# Patient Record
Sex: FEMALE | Race: BLACK OR AFRICAN AMERICAN | Employment: OTHER | URBAN - METROPOLITAN AREA
[De-identification: names, ages, dates, MRNs, and addresses within clinical notes are randomized per-mention and may not be internally consistent; named-entity substitution may affect disease eponyms.]

---

## 2020-06-09 ENCOUNTER — OP HISTORICAL/CONVERTED ENCOUNTER (OUTPATIENT)
Dept: OTHER | Age: 70
End: 2020-06-09

## 2020-07-01 ENCOUNTER — OP HISTORICAL/CONVERTED ENCOUNTER (OUTPATIENT)
Dept: OTHER | Age: 70
End: 2020-07-01

## 2020-08-01 ENCOUNTER — OP HISTORICAL/CONVERTED ENCOUNTER (OUTPATIENT)
Dept: OTHER | Age: 70
End: 2020-08-01

## 2020-08-18 ENCOUNTER — OP HISTORICAL/CONVERTED ENCOUNTER (OUTPATIENT)
Dept: OTHER | Age: 70
End: 2020-08-18

## 2020-09-01 ENCOUNTER — OP HISTORICAL/CONVERTED ENCOUNTER (OUTPATIENT)
Dept: OTHER | Age: 70
End: 2020-09-01

## 2020-09-08 ENCOUNTER — ED HISTORICAL/CONVERTED ENCOUNTER (OUTPATIENT)
Dept: OTHER | Age: 70
End: 2020-09-08

## 2020-09-10 ENCOUNTER — HOSPITAL ENCOUNTER (EMERGENCY)
Age: 70
Discharge: HOME | End: 2020-09-11
Attending: EMERGENCY MEDICINE | Admitting: EMERGENCY MEDICINE
Payer: MEDICARE

## 2020-09-10 DIAGNOSIS — J40 BRONCHITIS DUE TO COVID-19 VIRUS: Primary | ICD-10-CM

## 2020-09-10 DIAGNOSIS — U07.1 BRONCHITIS DUE TO COVID-19 VIRUS: Primary | ICD-10-CM

## 2020-09-10 PROCEDURE — 74011250637 HC RX REV CODE- 250/637: Performed by: EMERGENCY MEDICINE

## 2020-09-10 PROCEDURE — 99284 EMERGENCY DEPT VISIT MOD MDM: CPT

## 2020-09-10 PROCEDURE — 83880 ASSAY OF NATRIURETIC PEPTIDE: CPT

## 2020-09-10 PROCEDURE — 84484 ASSAY OF TROPONIN QUANT: CPT

## 2020-09-10 PROCEDURE — 82550 ASSAY OF CK (CPK): CPT

## 2020-09-10 PROCEDURE — 80053 COMPREHEN METABOLIC PANEL: CPT

## 2020-09-10 PROCEDURE — 85025 COMPLETE CBC W/AUTO DIFF WBC: CPT

## 2020-09-10 PROCEDURE — 94640 AIRWAY INHALATION TREATMENT: CPT

## 2020-09-10 PROCEDURE — 85379 FIBRIN DEGRADATION QUANT: CPT

## 2020-09-10 PROCEDURE — 36415 COLL VENOUS BLD VENIPUNCTURE: CPT

## 2020-09-10 PROCEDURE — 93005 ELECTROCARDIOGRAM TRACING: CPT

## 2020-09-10 RX ADMIN — IPRATROPIUM BROMIDE AND ALBUTEROL 4 PUFF: 20; 100 SPRAY, METERED RESPIRATORY (INHALATION) at 23:28

## 2020-09-11 VITALS
SYSTOLIC BLOOD PRESSURE: 138 MMHG | WEIGHT: 262.13 LBS | OXYGEN SATURATION: 98 % | BODY MASS INDEX: 42.96 KG/M2 | TEMPERATURE: 97.8 F | RESPIRATION RATE: 16 BRPM | DIASTOLIC BLOOD PRESSURE: 68 MMHG | HEART RATE: 72 BPM

## 2020-09-11 LAB
ALBUMIN SERPL-MCNC: 3.3 G/DL (ref 3.5–5)
ALBUMIN/GLOB SERPL: 0.8 {RATIO} (ref 1.1–2.2)
ALP SERPL-CCNC: 86 U/L (ref 45–117)
ALT SERPL-CCNC: 26 U/L (ref 12–78)
ANION GAP SERPL CALC-SCNC: 3 MMOL/L (ref 5–15)
AST SERPL-CCNC: 23 U/L (ref 15–37)
ATRIAL RATE: 78 BPM
BASOPHILS # BLD: 0.1 K/UL (ref 0–0.1)
BASOPHILS NFR BLD: 2 % (ref 0–1)
BILIRUB SERPL-MCNC: 0.5 MG/DL (ref 0.2–1)
BNP SERPL-MCNC: 77 PG/ML
BUN SERPL-MCNC: 22 MG/DL (ref 6–20)
BUN/CREAT SERPL: 22 (ref 12–20)
CALCIUM SERPL-MCNC: 8.5 MG/DL (ref 8.5–10.1)
CALCULATED P AXIS, ECG09: -9 DEGREES
CALCULATED R AXIS, ECG10: -23 DEGREES
CALCULATED T AXIS, ECG11: 9 DEGREES
CHLORIDE SERPL-SCNC: 106 MMOL/L (ref 97–108)
CK SERPL-CCNC: 69 U/L (ref 26–192)
CO2 SERPL-SCNC: 29 MMOL/L (ref 21–32)
CREAT SERPL-MCNC: 1 MG/DL (ref 0.55–1.02)
D DIMER PPP FEU-MCNC: 0.5 MG/L FEU (ref 0–0.65)
DIAGNOSIS, 93000: NORMAL
DIFFERENTIAL METHOD BLD: ABNORMAL
EOSINOPHIL # BLD: 0.4 K/UL (ref 0–0.4)
EOSINOPHIL NFR BLD: 10 % (ref 0–7)
ERYTHROCYTE [DISTWIDTH] IN BLOOD BY AUTOMATED COUNT: 18.2 % (ref 11.5–14.5)
GLOBULIN SER CALC-MCNC: 4 G/DL (ref 2–4)
GLUCOSE SERPL-MCNC: 86 MG/DL (ref 65–100)
HCT VFR BLD AUTO: 36.7 % (ref 35–47)
HGB BLD-MCNC: 12.1 G/DL (ref 11.5–16)
IMM GRANULOCYTES # BLD AUTO: 0 K/UL (ref 0–0.04)
IMM GRANULOCYTES NFR BLD AUTO: 0 % (ref 0–0.5)
LYMPHOCYTES # BLD: 1.1 K/UL (ref 0.8–3.5)
LYMPHOCYTES NFR BLD: 28 % (ref 12–49)
MCH RBC QN AUTO: 29.7 PG (ref 26–34)
MCHC RBC AUTO-ENTMCNC: 33 G/DL (ref 30–36.5)
MCV RBC AUTO: 90.2 FL (ref 80–99)
MONOCYTES # BLD: 0.3 K/UL (ref 0–1)
MONOCYTES NFR BLD: 8 % (ref 5–13)
NEUTS SEG # BLD: 2.1 K/UL (ref 1.8–8)
NEUTS SEG NFR BLD: 52 % (ref 32–75)
NRBC # BLD: 0 K/UL (ref 0–0.01)
NRBC BLD-RTO: 0 PER 100 WBC
P-R INTERVAL, ECG05: 180 MS
PLATELET # BLD AUTO: 325 K/UL (ref 150–400)
PMV BLD AUTO: 9.8 FL (ref 8.9–12.9)
POTASSIUM SERPL-SCNC: 4.3 MMOL/L (ref 3.5–5.1)
PROT SERPL-MCNC: 7.3 G/DL (ref 6.4–8.2)
Q-T INTERVAL, ECG07: 374 MS
QRS DURATION, ECG06: 64 MS
QTC CALCULATION (BEZET), ECG08: 426 MS
RBC # BLD AUTO: 4.07 M/UL (ref 3.8–5.2)
SODIUM SERPL-SCNC: 138 MMOL/L (ref 136–145)
TROPONIN I SERPL-MCNC: <0.05 NG/ML
VENTRICULAR RATE, ECG03: 78 BPM
WBC # BLD AUTO: 4 K/UL (ref 3.6–11)

## 2020-09-11 RX ORDER — DOXYCYCLINE HYCLATE 100 MG
100 TABLET ORAL 2 TIMES DAILY
Qty: 20 TAB | Refills: 0 | Status: SHIPPED | OUTPATIENT
Start: 2020-09-11 | End: 2020-09-21

## 2020-09-11 RX ORDER — ASCORBIC ACID 500 MG
500 TABLET ORAL 2 TIMES DAILY
Qty: 20 TAB | Refills: 0 | Status: SHIPPED | OUTPATIENT
Start: 2020-09-11 | End: 2020-09-21

## 2020-09-11 RX ORDER — ALBUTEROL SULFATE 90 UG/1
2 AEROSOL, METERED RESPIRATORY (INHALATION)
Qty: 1 INHALER | Refills: 1 | Status: SHIPPED | OUTPATIENT
Start: 2020-09-11 | End: 2022-06-22

## 2020-09-11 RX ORDER — PREDNISONE 50 MG/1
50 TABLET ORAL DAILY
Qty: 5 TAB | Refills: 0 | Status: SHIPPED | OUTPATIENT
Start: 2020-09-11 | End: 2020-09-16

## 2020-09-11 RX ORDER — ZINC SULFATE 50(220)MG
220 CAPSULE ORAL DAILY
Qty: 10 CAP | Refills: 0 | Status: SHIPPED | OUTPATIENT
Start: 2020-09-11 | End: 2022-06-22

## 2020-09-11 NOTE — ED TRIAGE NOTES
Pt arrives for c/o worsening SOB sine being diagnosed with COVID on 8/25/20. Pt states she was seen at [facility] this past Tuesday and d/c home after chest xray and blood work. Pt states she was notified by PCP that she has a bronchitis and pneumonia. Was told to come to ED today for further evaluation. Pt reports she checks SpO2 at home, states it drops after exertion. Pt ambulated to triage. SpO2 99% on RA after ambulating.

## 2020-09-11 NOTE — DISCHARGE INSTRUCTIONS
Patient Education        Bronchitis: Care Instructions  Your Care Instructions    Bronchitis is inflammation of the bronchial tubes, which carry air to the lungs. The tubes swell and produce mucus, or phlegm. The mucus and inflamed bronchial tubes make you cough. You may have trouble breathing. Most cases of bronchitis are caused by viruses like those that cause colds. Antibiotics usually do not help and they may be harmful. Bronchitis usually develops rapidly and lasts about 2 to 3 weeks in otherwise healthy people. Follow-up care is a key part of your treatment and safety. Be sure to make and go to all appointments, and call your doctor if you are having problems. It's also a good idea to know your test results and keep a list of the medicines you take. How can you care for yourself at home? · Take all medicines exactly as prescribed. Call your doctor if you think you are having a problem with your medicine. · Get some extra rest.  · Take an over-the-counter pain medicine, such as acetaminophen (Tylenol), ibuprofen (Advil, Motrin), or naproxen (Aleve) to reduce fever and relieve body aches. Read and follow all instructions on the label. · Do not take two or more pain medicines at the same time unless the doctor told you to. Many pain medicines have acetaminophen, which is Tylenol. Too much acetaminophen (Tylenol) can be harmful. · Take an over-the-counter cough medicine that contains dextromethorphan to help quiet a dry, hacking cough so that you can sleep. Avoid cough medicines that have more than one active ingredient. Read and follow all instructions on the label. · Breathe moist air from a humidifier, hot shower, or sink filled with hot water. The heat and moisture will thin mucus so you can cough it out. · Do not smoke. Smoking can make bronchitis worse. If you need help quitting, talk to your doctor about stop-smoking programs and medicines.  These can increase your chances of quitting for good.  When should you call for help? Call 911 anytime you think you may need emergency care. For example, call if:    · You have severe trouble breathing.    Call your doctor now or seek immediate medical care if:    · You have new or worse trouble breathing.     · You cough up dark brown or bloody mucus (sputum).     · You have a new or higher fever.     · You have a new rash.    Watch closely for changes in your health, and be sure to contact your doctor if:    · You cough more deeply or more often, especially if you notice more mucus or a change in the color of your mucus.     · You are not getting better as expected. Where can you learn more? Go to http://viviana-kavita.info/  Enter H333 in the search box to learn more about \"Bronchitis: Care Instructions. \"  Current as of: June 9, 2019Content Version: 12.4  © 8021-8523 [employer]. Care instructions adapted under license by [employer] (which disclaims liability or warranty for this information). If you have questions about a medical condition or this instruction, always ask your healthcare professional. [place] [address] any warranty or liability for your use of this information. Patient Education        10 Things to Do When You Have COVID-19    Stay home. Don't go to school, work, or public areas. And don't use public transportation, ride-shares, or taxis unless you have no choice. Leave your home only if you need to get medical care. But call the doctor's office first so they know you're coming. And wear a cloth face cover. Ask before leaving isolation. Talk with your doctor or other health professional about when it will be safe for you to leave isolation. Wear a cloth face cover when you are around other people. It can help stop the spread of the virus when you cough or sneeze. Limit contact with people in your home.   If possible, stay in a separate bedroom and use a separate bathroom. Avoid contact with pets and other animals. If possible, have a friend or family member care for them while you're sick. Cover your mouth and nose with a tissue when you cough or sneeze. Then throw the tissue in the trash right away. Wash your hands often, especially after you cough or sneeze. Use soap and water, and scrub for at least 20 seconds. If soap and water aren't available, use an alcohol-based hand . Don't share personal household items. These include bedding, towels, cups and glasses, and eating utensils. Clean and disinfect your home every day. Use household  or disinfectant wipes or sprays. Take special care to clean things that you grab with your hands. These include doorknobs, remote controls, phones, and handles on your refrigerator and microwave. And don't forget countertops, tabletops, bathrooms, and computer keyboards. Take acetaminophen (Tylenol) to relieve fever and body aches. Read and follow all instructions on the label. Current as of: July 10, 2020               Content Version: 12.6  © 5369-4293 [employer]. Care instructions adapted under license by [employer] (which disclaims liability or warranty for this information). If you have questions about a medical condition or this instruction, always ask your healthcare professional. [address] any warranty or liability for your use of this information.

## 2020-09-11 NOTE — ED PROVIDER NOTES
The patient is a 79-year-old female with a past medical history significant for fibromyalgia, chronic pain, diabetes, hypertension, GERD, morbid obesity, and recent diagnosis of gout with 19 infection 2 weeks ago who presents to the ED with a complaint of persistent shortness of breath for a week on exertion. The patient also complains of dry cough. The patient states that she was seen at [facility] 2 days ago where she had a negative work-up for shortness of breath. She follow-up with her PCP the next day and was told that she has pneumonia. The patient has not had any improvement of her symptoms and her PCP referred her to this ER for further evaluation. She denies any fever, headache, sore throat, runny nose, congestion, nausea, vomiting, abdominal pain, diarrhea, constipation, dysuria, vaginal discharge or bleeding, extremity weakness or numbness, sick contact and skin rash.            Past Medical History:   Diagnosis Date    Chronic pain     fibromyalgia chronic bilateral knee pain    Diabetes (HCC)     Pre-diabetes    Fibromyalgia     GERD (gastroesophageal reflux disease)     Morbid obesity (HCC)        Past Surgical History:   Procedure Laterality Date    COLONOSCOPY N/A 12/14/2016    COLONOSCOPY / EGD  performed by Merna Lubin MD at [address] [facility] HX HYSTERECTOMY      HX ORTHOPAEDIC  2005    left bunionectomy    HX SHOULDER REPLACEMENT Bilateral     right 2013 left 2015         Family History:   Problem Relation Age of Onset    Heart Disease Other     Stroke Other        Social History     Socioeconomic History    Marital status:      Spouse name: Not on file    Number of children: Not on file    Years of education: Not on file    Highest education level: Not on file   Occupational History    Not on file   Social Needs    Financial resource strain: Not on file    Food insecurity     Worry: Not on file     Inability: Not on file   Elvira Chun Transportation needs     Medical: Not on file     Non-medical: Not on file   Tobacco Use    Smoking status: Former Smoker    Smokeless tobacco: Never Used   Substance and Sexual Activity    Alcohol use: No    Drug use: No    Sexual activity: Not on file   Lifestyle    Physical activity     Days per week: Not on file     Minutes per session: Not on file    Stress: Not on file   Relationships    Social connections     Talks on phone: Not on file     Gets together: Not on file     Attends Restorationist service: Not on file     Active member of club or organization: Not on file     Attends meetings of clubs or organizations: Not on file     Relationship status: Not on file    Intimate partner violence     Fear of current or ex partner: Not on file     Emotionally abused: Not on file     Physically abused: Not on file     Forced sexual activity: Not on file   Other Topics Concern    Not on file   Social History Narrative    Not on file         ALLERGIES: Oxycodone; Aspirin; Lamisil [terbinafine]; Sulfa (sulfonamide antibiotics); and Sulfabenzamide    Review of Systems    Vitals:    09/10/20 2136   BP: 149/85   Pulse: 81   Resp: 16   Temp: 98.8 °F (37.1 °C)   SpO2: 96%   Weight: 118.9 kg (262 lb 2 oz)            Physical Exam  Vitals signs and nursing note reviewed. CONSTITUTIONAL: Well-appearing; well-nourished; in no apparent distress  HEAD: Normocephalic; atraumatic  EYES: PERRL; EOM intact; conjunctiva and sclera are clear bilaterally. ENT: No rhinorrhea; normal pharynx with no tonsillar hypertrophy; mucous membranes pink/moist, no erythema, no exudate. NECK: Supple; non-tender; no cervical lymphadenopathy  CARD: Normal S1, S2; no murmurs, rubs, or gallops. Regular rate and rhythm. RESP: Normal respiratory effort; breath sounds clear and equal bilaterally; no wheezes, rhonchi, or rales. ABD: Normal bowel sounds; non-distended; non-tender; no palpable organomegaly, no masses, no bruits.   Back Exam: Normal inspection; no vertebral point tenderness, no CVA tenderness. Normal range of motion. EXT: Normal ROM in all four extremities; non-tender to palpation; no swelling or deformity; distal pulses are normal, no edema. SKIN: Warm; dry; no rash. NEURO:Alert and oriented x 3, coherent, ESTEFANY-XII grossly intact, sensory and motor are non-focal.        MDM  Number of Diagnoses or Management Options  Diagnosis management comments: Assessment: This is a 78-year-old female with a nonfocal exam who presents to the ED with persistent dyspnea on exertion and chills and allegedly diagnosis of pneumonia who is currently not on any antibiotics at this time. The patient will need evaluation for ACS, VTE, CHF, worsening pneumonia    Plan: EKG/chest x-ray/lab/IV fluid/DuoNeb/steroid/serial exam/ Monitor and Reevaluate. Amount and/or Complexity of Data Reviewed  Clinical lab tests: ordered and reviewed  Tests in the radiology section of CPT®: reviewed and ordered  Tests in the medicine section of CPT®: reviewed and ordered  Discussion of test results with the performing providers: yes  Decide to obtain previous medical records or to obtain history from someone other than the patient: yes  Obtain history from someone other than the patient: yes  Review and summarize past medical records: yes  Discuss the patient with other providers: yes  Independent visualization of images, tracings, or specimens: yes    Risk of Complications, Morbidity, and/or Mortality  Presenting problems: moderate  Diagnostic procedures: moderate  Management options: moderate           Procedures    ED EKG interpretation:  Rhythm: normal sinus rhythm; and regular . Rate (approx.): 78; Axis: left axis deviation; P wave: normal; QRS interval: normal ; ST/T wave: normal; in  Lead: 1 unitther findings: borderline ekg. This EKG was interpreted by Gee Alcantara MD,ED Provider.     Progress Note:   Pt has been reexamined by Gee Alcantara MD. Pt is feeling much better. Symptoms have improved. All available results have been reviewed with pt and any available family. Pt understands sx, dx, and tx in ED. Care plan has been outlined and questions have been answered. Pt is ready to go home. Will send home on COVID bronchitis/URI instruction. Prescription of doxycycline, albuterol inhaler and Robitussin instruction. Outpatient referral with PCP as needed. Written by Carlos Whitaker MD,4:25 AM    .   .

## 2020-09-11 NOTE — ED NOTES
Dr. Annabelle Dolan has reviewed discharge instructions with the patient. The patient verbalized understanding.

## 2020-09-12 ENCOUNTER — PATIENT OUTREACH (OUTPATIENT)
Dept: CASE MANAGEMENT | Age: 70
End: 2020-09-12

## 2020-09-12 NOTE — ACP (ADVANCE CARE PLANNING)
Advance Care Planning:   Does patient have an Advance Directive: currently not on file; patient declined education-wishes to add her daughter, Wayne Hdz at  to medical agent list.

## 2020-09-12 NOTE — PROGRESS NOTES
Patient contacted regarding COVID-19 diagnosis. Discussed COVID-19 related testing which was available at this time. Test results were positive. Patient informed of results, if available? yes -was tested at outside facility   Care Transition Nurse/ Ambulatory Care Manager/ LPN Care Coordinator contacted the patient by telephone to perform post discharge assessment. Verified name and  with patient as identifiers. Provided introduction to self, and explanation of the CTN/ACM/LPN role, and reason for call due to risk factors for infection and/or exposure to COVID-19. Symptoms reviewed with patient who verbalized the following symptoms: fever, fatigue, pain or aching joints, cough and shortness of breath. Due to no new or worsening symptoms encounter was not routed to provider for escalation. Discussed follow-up appointments. If no appointment was previously scheduled, appointment scheduling offered: no Patient has followed up with her PCP. [facility] follow up appointment(s): No future appointments. Non-[facility] follow up appointment(s): none reported      Advance Care Planning:   Does patient have an Advance Directive: currently not on file; patient declined education-wishes to add her daughter, Amarilis Funes at  to medical agent list.     Patient has following risk factors of: diabetes. CTN/ACM/LPN reviewed discharge instructions, medical action plan and red flags such as increased shortness of breath, increasing fever and signs of decompensation with patient who verbalized understanding. Discussed exposure protocols and quarantine with CDC Guidelines What to do if you are sick with coronavirus disease 2019.  Patient was given an opportunity for questions and concerns. The patient agrees to contact the [facility] exposure line [phone], [facility]  ([phone]) and PCP office for questions related to their healthcare.  CTN/ACM provided contact information for future needs. Reviewed and educated patient on any new and changed medications related to discharge diagnosis. Patient reported she has all discharge medications and is taking as prescribed. Patient/family/caregiver given information for [employer] and agrees to enroll yes  Patient's preferred e-mail:  n/a  Patient's preferred phone number: [phone]  Based on Loop alert triggers, patient will be contacted by nurse care manager for worsening symptoms. Pt will be further monitored by COVID Loop Team based on severity of symptoms and risk factors. [facility]  9/12/20 ACM attempted to contact patient at numbers provided-left messages for patient call back. DMB

## 2020-09-24 ENCOUNTER — PATIENT OUTREACH (OUTPATIENT)
Dept: CASE MANAGEMENT | Age: 70
End: 2020-09-24

## 2020-09-24 NOTE — PROGRESS NOTES
Yellow alert noted in remote symptom monitoring program. Messaged patient to notify [facility] if symptoms have worsened since yesterday or if they would like to have a nurse reach out.

## 2020-10-01 ENCOUNTER — OP HISTORICAL/CONVERTED ENCOUNTER (OUTPATIENT)
Dept: OTHER | Age: 70
End: 2020-10-01

## 2021-10-01 ENCOUNTER — TRANSCRIBE ORDER (OUTPATIENT)
Dept: SCHEDULING | Age: 71
End: 2021-10-01

## 2021-10-01 DIAGNOSIS — M06.00 RHEUMATOID ARTHRITIS WITHOUT RHEUMATOID FACTOR, UNSPECIFIED SITE (HCC): Primary | ICD-10-CM

## 2022-02-18 ENCOUNTER — TRANSCRIBE ORDER (OUTPATIENT)
Dept: SCHEDULING | Age: 72
End: 2022-02-18

## 2022-02-18 DIAGNOSIS — M17.12 DEGENERATIVE ARTHRITIS OF LEFT KNEE: Primary | ICD-10-CM

## 2022-03-02 ENCOUNTER — HOSPITAL ENCOUNTER (OUTPATIENT)
Dept: MRI IMAGING | Age: 72
Discharge: HOME | End: 2022-03-02
Attending: FAMILY MEDICINE
Payer: MEDICARE

## 2022-03-02 DIAGNOSIS — M06.00 RHEUMATOID ARTHRITIS WITHOUT RHEUMATOID FACTOR, UNSPECIFIED SITE (HCC): ICD-10-CM

## 2022-03-02 DIAGNOSIS — M17.12 DEGENERATIVE ARTHRITIS OF LEFT KNEE: ICD-10-CM

## 2022-03-02 PROCEDURE — 72148 MRI LUMBAR SPINE W/O DYE: CPT

## 2022-03-02 PROCEDURE — 73721 MRI JNT OF LWR EXTRE W/O DYE: CPT

## 2022-06-22 ENCOUNTER — APPOINTMENT (OUTPATIENT)
Dept: CT IMAGING | Age: 72
DRG: 270 | End: 2022-06-22
Attending: EMERGENCY MEDICINE
Payer: MEDICARE

## 2022-06-22 ENCOUNTER — HOSPITAL ENCOUNTER (INPATIENT)
Age: 72
LOS: 2 days | Discharge: HOME | DRG: 270 | End: 2022-06-24
Attending: EMERGENCY MEDICINE | Admitting: INTERNAL MEDICINE
Payer: MEDICARE

## 2022-06-22 ENCOUNTER — APPOINTMENT (OUTPATIENT)
Dept: VASCULAR SURGERY | Age: 72
DRG: 270 | End: 2022-06-22
Attending: EMERGENCY MEDICINE
Payer: MEDICARE

## 2022-06-22 DIAGNOSIS — I82.90 CLOT: ICD-10-CM

## 2022-06-22 DIAGNOSIS — I82.4Y2 ACUTE DEEP VEIN THROMBOSIS (DVT) OF PROXIMAL VEIN OF LEFT LOWER EXTREMITY (HCC): Primary | ICD-10-CM

## 2022-06-22 DIAGNOSIS — I26.94 MULTIPLE SUBSEGMENTAL PULMONARY EMBOLI WITHOUT ACUTE COR PULMONALE (HCC): ICD-10-CM

## 2022-06-22 PROBLEM — I26.99 BILATERAL PULMONARY EMBOLISM (HCC): Status: ACTIVE | Noted: 2022-06-22

## 2022-06-22 LAB
ALBUMIN SERPL-MCNC: 3 G/DL (ref 3.5–5)
ALBUMIN/GLOB SERPL: 0.7 {RATIO} (ref 1.1–2.2)
ALP SERPL-CCNC: 77 U/L (ref 45–117)
ALT SERPL-CCNC: 26 U/L (ref 12–78)
ANION GAP SERPL CALC-SCNC: 7 MMOL/L (ref 5–15)
APTT PPP: 24.2 SEC (ref 22.1–31)
APTT PPP: 32.3 SEC (ref 22.1–31)
AST SERPL-CCNC: 35 U/L (ref 15–37)
BASOPHILS # BLD: 0 K/UL (ref 0–0.1)
BASOPHILS # BLD: 0 K/UL (ref 0–0.1)
BASOPHILS NFR BLD: 1 % (ref 0–1)
BASOPHILS NFR BLD: 1 % (ref 0–1)
BILIRUB SERPL-MCNC: 1 MG/DL (ref 0.2–1)
BNP SERPL-MCNC: 78 PG/ML
BUN SERPL-MCNC: 21 MG/DL (ref 6–20)
BUN/CREAT SERPL: 22 (ref 12–20)
CALCIUM SERPL-MCNC: 9 MG/DL (ref 8.5–10.1)
CHLORIDE SERPL-SCNC: 102 MMOL/L (ref 97–108)
CO2 SERPL-SCNC: 28 MMOL/L (ref 21–32)
CREAT SERPL-MCNC: 0.95 MG/DL (ref 0.55–1.02)
DIFFERENTIAL METHOD BLD: ABNORMAL
DIFFERENTIAL METHOD BLD: ABNORMAL
EOSINOPHIL # BLD: 0.3 K/UL (ref 0–0.4)
EOSINOPHIL # BLD: 0.3 K/UL (ref 0–0.4)
EOSINOPHIL NFR BLD: 5 % (ref 0–7)
EOSINOPHIL NFR BLD: 6 % (ref 0–7)
ERYTHROCYTE [DISTWIDTH] IN BLOOD BY AUTOMATED COUNT: 18.5 % (ref 11.5–14.5)
ERYTHROCYTE [DISTWIDTH] IN BLOOD BY AUTOMATED COUNT: 18.7 % (ref 11.5–14.5)
GLOBULIN SER CALC-MCNC: 4.5 G/DL (ref 2–4)
GLUCOSE BLD STRIP.AUTO-MCNC: 72 MG/DL (ref 65–117)
GLUCOSE BLD STRIP.AUTO-MCNC: 75 MG/DL (ref 65–117)
GLUCOSE BLD STRIP.AUTO-MCNC: 88 MG/DL (ref 65–117)
GLUCOSE BLD STRIP.AUTO-MCNC: 99 MG/DL (ref 65–117)
GLUCOSE SERPL-MCNC: 105 MG/DL (ref 65–100)
HCT VFR BLD AUTO: 31.9 % (ref 35–47)
HCT VFR BLD AUTO: 35.8 % (ref 35–47)
HGB BLD-MCNC: 10.6 G/DL (ref 11.5–16)
HGB BLD-MCNC: 11.9 G/DL (ref 11.5–16)
IMM GRANULOCYTES # BLD AUTO: 0 K/UL (ref 0–0.04)
IMM GRANULOCYTES # BLD AUTO: 0 K/UL (ref 0–0.04)
IMM GRANULOCYTES NFR BLD AUTO: 0 % (ref 0–0.5)
IMM GRANULOCYTES NFR BLD AUTO: 0 % (ref 0–0.5)
LYMPHOCYTES # BLD: 1.3 K/UL (ref 0.8–3.5)
LYMPHOCYTES # BLD: 1.3 K/UL (ref 0.8–3.5)
LYMPHOCYTES NFR BLD: 20 % (ref 12–49)
LYMPHOCYTES NFR BLD: 26 % (ref 12–49)
MAGNESIUM SERPL-MCNC: 1.8 MG/DL (ref 1.6–2.4)
MCH RBC QN AUTO: 30.4 PG (ref 26–34)
MCH RBC QN AUTO: 30.5 PG (ref 26–34)
MCHC RBC AUTO-ENTMCNC: 33.2 G/DL (ref 30–36.5)
MCHC RBC AUTO-ENTMCNC: 33.2 G/DL (ref 30–36.5)
MCV RBC AUTO: 91.6 FL (ref 80–99)
MCV RBC AUTO: 91.9 FL (ref 80–99)
MONOCYTES # BLD: 0.2 K/UL (ref 0–1)
MONOCYTES # BLD: 0.2 K/UL (ref 0–1)
MONOCYTES NFR BLD: 3 % (ref 5–13)
MONOCYTES NFR BLD: 3 % (ref 5–13)
NEUTS SEG # BLD: 3.3 K/UL (ref 1.8–8)
NEUTS SEG # BLD: 4.5 K/UL (ref 1.8–8)
NEUTS SEG NFR BLD: 64 % (ref 32–75)
NEUTS SEG NFR BLD: 71 % (ref 32–75)
NRBC # BLD: 0 K/UL (ref 0–0.01)
NRBC # BLD: 0 K/UL (ref 0–0.01)
NRBC BLD-RTO: 0 PER 100 WBC
NRBC BLD-RTO: 0 PER 100 WBC
PLATELET # BLD AUTO: 250 K/UL (ref 150–400)
PLATELET # BLD AUTO: 266 K/UL (ref 150–400)
PMV BLD AUTO: 9.4 FL (ref 8.9–12.9)
PMV BLD AUTO: 9.8 FL (ref 8.9–12.9)
POTASSIUM SERPL-SCNC: 3.9 MMOL/L (ref 3.5–5.1)
PROT SERPL-MCNC: 7.5 G/DL (ref 6.4–8.2)
RBC # BLD AUTO: 3.47 M/UL (ref 3.8–5.2)
RBC # BLD AUTO: 3.91 M/UL (ref 3.8–5.2)
SERVICE CMNT-IMP: NORMAL
SODIUM SERPL-SCNC: 137 MMOL/L (ref 136–145)
THERAPEUTIC RANGE,PTTT: ABNORMAL SECS (ref 58–77)
THERAPEUTIC RANGE,PTTT: NORMAL SECS (ref 58–77)
TROPONIN-HIGH SENSITIVITY: 7 NG/L (ref 0–51)
WBC # BLD AUTO: 5.2 K/UL (ref 3.6–11)
WBC # BLD AUTO: 6.4 K/UL (ref 3.6–11)

## 2022-06-22 PROCEDURE — 74011000636 HC RX REV CODE- 636: Performed by: EMERGENCY MEDICINE

## 2022-06-22 PROCEDURE — 93005 ELECTROCARDIOGRAM TRACING: CPT

## 2022-06-22 PROCEDURE — 65270000046 HC RM TELEMETRY

## 2022-06-22 PROCEDURE — 85730 THROMBOPLASTIN TIME PARTIAL: CPT

## 2022-06-22 PROCEDURE — 99285 EMERGENCY DEPT VISIT HI MDM: CPT

## 2022-06-22 PROCEDURE — 74011250637 HC RX REV CODE- 250/637: Performed by: INTERNAL MEDICINE

## 2022-06-22 PROCEDURE — 36415 COLL VENOUS BLD VENIPUNCTURE: CPT

## 2022-06-22 PROCEDURE — 80053 COMPREHEN METABOLIC PANEL: CPT

## 2022-06-22 PROCEDURE — 71275 CT ANGIOGRAPHY CHEST: CPT

## 2022-06-22 PROCEDURE — 82962 GLUCOSE BLOOD TEST: CPT

## 2022-06-22 PROCEDURE — 93971 EXTREMITY STUDY: CPT

## 2022-06-22 PROCEDURE — 74011250636 HC RX REV CODE- 250/636: Performed by: EMERGENCY MEDICINE

## 2022-06-22 PROCEDURE — 74011250636 HC RX REV CODE- 250/636: Performed by: INTERNAL MEDICINE

## 2022-06-22 PROCEDURE — 85025 COMPLETE CBC W/AUTO DIFF WBC: CPT

## 2022-06-22 PROCEDURE — 74011000258 HC RX REV CODE- 258: Performed by: EMERGENCY MEDICINE

## 2022-06-22 PROCEDURE — 83880 ASSAY OF NATRIURETIC PEPTIDE: CPT

## 2022-06-22 PROCEDURE — 74011000250 HC RX REV CODE- 250: Performed by: INTERNAL MEDICINE

## 2022-06-22 PROCEDURE — 83735 ASSAY OF MAGNESIUM: CPT

## 2022-06-22 PROCEDURE — 84484 ASSAY OF TROPONIN QUANT: CPT

## 2022-06-22 RX ORDER — SODIUM CHLORIDE 0.9 % (FLUSH) 0.9 %
5-40 SYRINGE (ML) INJECTION EVERY 8 HOURS
Status: DISCONTINUED | OUTPATIENT
Start: 2022-06-22 | End: 2022-06-24 | Stop reason: HOSPADM

## 2022-06-22 RX ORDER — SEMAGLUTIDE 1.34 MG/ML
0.25 INJECTION, SOLUTION SUBCUTANEOUS
COMMUNITY

## 2022-06-22 RX ORDER — LANOLIN ALCOHOL/MO/W.PET/CERES
5 CREAM (GRAM) TOPICAL
Status: DISCONTINUED | OUTPATIENT
Start: 2022-06-22 | End: 2022-06-24 | Stop reason: HOSPADM

## 2022-06-22 RX ORDER — DULOXETIN HYDROCHLORIDE 30 MG/1
60 CAPSULE, DELAYED RELEASE ORAL DAILY
Status: DISCONTINUED | OUTPATIENT
Start: 2022-06-23 | End: 2022-06-22

## 2022-06-22 RX ORDER — HEPARIN SODIUM 1000 [USP'U]/ML
80 INJECTION, SOLUTION INTRAVENOUS; SUBCUTANEOUS ONCE
Status: COMPLETED | OUTPATIENT
Start: 2022-06-22 | End: 2022-06-22

## 2022-06-22 RX ORDER — MAGNESIUM SULFATE 100 %
4 CRYSTALS MISCELLANEOUS AS NEEDED
Status: DISCONTINUED | OUTPATIENT
Start: 2022-06-22 | End: 2022-06-24 | Stop reason: HOSPADM

## 2022-06-22 RX ORDER — TROSPIUM CHLORIDE 20 MG/1
20 TABLET, FILM COATED ORAL
COMMUNITY

## 2022-06-22 RX ORDER — ACETAMINOPHEN 325 MG/1
650 TABLET ORAL
Status: DISCONTINUED | OUTPATIENT
Start: 2022-06-22 | End: 2022-06-24 | Stop reason: HOSPADM

## 2022-06-22 RX ORDER — PANTOPRAZOLE SODIUM 40 MG/1
40 TABLET, DELAYED RELEASE ORAL
Status: DISCONTINUED | OUTPATIENT
Start: 2022-06-22 | End: 2022-06-24 | Stop reason: HOSPADM

## 2022-06-22 RX ORDER — ZINC SULFATE 50(220)MG
220 CAPSULE ORAL DAILY
Status: DISCONTINUED | OUTPATIENT
Start: 2022-06-23 | End: 2022-06-22

## 2022-06-22 RX ORDER — DULOXETIN HYDROCHLORIDE 30 MG/1
60 CAPSULE, DELAYED RELEASE ORAL
Status: DISCONTINUED | OUTPATIENT
Start: 2022-06-22 | End: 2022-06-24 | Stop reason: HOSPADM

## 2022-06-22 RX ORDER — DEXTROSE MONOHYDRATE 100 MG/ML
0-250 INJECTION, SOLUTION INTRAVENOUS AS NEEDED
Status: DISCONTINUED | OUTPATIENT
Start: 2022-06-22 | End: 2022-06-24 | Stop reason: HOSPADM

## 2022-06-22 RX ORDER — ASPIRIN 81 MG/1
81 TABLET ORAL DAILY
COMMUNITY
End: 2022-06-23

## 2022-06-22 RX ORDER — THERA TABS 400 MCG
1 TAB ORAL DAILY
Status: DISCONTINUED | OUTPATIENT
Start: 2022-06-23 | End: 2022-06-24 | Stop reason: HOSPADM

## 2022-06-22 RX ORDER — ATORVASTATIN CALCIUM 10 MG/1
5 TABLET, FILM COATED ORAL DAILY
Status: DISCONTINUED | OUTPATIENT
Start: 2022-06-23 | End: 2022-06-22

## 2022-06-22 RX ORDER — TROSPIUM CHLORIDE 20 MG/1
20 TABLET, FILM COATED ORAL
Status: DISCONTINUED | OUTPATIENT
Start: 2022-06-22 | End: 2022-06-24 | Stop reason: HOSPADM

## 2022-06-22 RX ORDER — SODIUM CHLORIDE 0.9 % (FLUSH) 0.9 %
5-40 SYRINGE (ML) INJECTION AS NEEDED
Status: DISCONTINUED | OUTPATIENT
Start: 2022-06-22 | End: 2022-06-24 | Stop reason: HOSPADM

## 2022-06-22 RX ORDER — HEPARIN SODIUM 5000 [USP'U]/ML
80 INJECTION, SOLUTION INTRAVENOUS; SUBCUTANEOUS
Status: COMPLETED | OUTPATIENT
Start: 2022-06-22 | End: 2022-06-22

## 2022-06-22 RX ORDER — CYANOCOBALAMIN/FOLIC AC/VIT B6 1-2.5-25MG
1 TABLET ORAL DAILY
COMMUNITY

## 2022-06-22 RX ORDER — HEPARIN SODIUM 5000 [USP'U]/ML
INJECTION, SOLUTION INTRAVENOUS; SUBCUTANEOUS
Status: DISPENSED
Start: 2022-06-22 | End: 2022-06-23

## 2022-06-22 RX ORDER — DICLOFENAC SODIUM 10 MG/G
2 GEL TOPICAL 4 TIMES DAILY
COMMUNITY
End: 2022-06-23

## 2022-06-22 RX ORDER — GABAPENTIN 400 MG/1
400 CAPSULE ORAL
COMMUNITY

## 2022-06-22 RX ORDER — HYDROCHLOROTHIAZIDE 25 MG/1
25 TABLET ORAL
Status: DISCONTINUED | OUTPATIENT
Start: 2022-06-22 | End: 2022-06-24 | Stop reason: HOSPADM

## 2022-06-22 RX ORDER — MELATONIN 5 MG
5 CAPSULE ORAL
COMMUNITY

## 2022-06-22 RX ORDER — INSULIN LISPRO 100 [IU]/ML
INJECTION, SOLUTION INTRAVENOUS; SUBCUTANEOUS
Status: DISCONTINUED | OUTPATIENT
Start: 2022-06-22 | End: 2022-06-23

## 2022-06-22 RX ORDER — HEPARIN SODIUM 10000 [USP'U]/100ML
13-36 INJECTION, SOLUTION INTRAVENOUS
Status: DISCONTINUED | OUTPATIENT
Start: 2022-06-22 | End: 2022-06-24

## 2022-06-22 RX ADMIN — HEPARIN SODIUM 13 UNITS/KG/HR: 10000 INJECTION INTRAVENOUS; SUBCUTANEOUS at 16:06

## 2022-06-22 RX ADMIN — IOPAMIDOL 100 ML: 755 INJECTION, SOLUTION INTRAVENOUS at 13:36

## 2022-06-22 RX ADMIN — Medication 10 ML: at 16:27

## 2022-06-22 RX ADMIN — GABAPENTIN 400 MG: 100 CAPSULE ORAL at 21:31

## 2022-06-22 RX ADMIN — HEPARIN SODIUM 9260 UNITS: 1000 INJECTION INTRAVENOUS; SUBCUTANEOUS at 16:03

## 2022-06-22 RX ADMIN — DULOXETINE HYDROCHLORIDE 60 MG: 30 CAPSULE, DELAYED RELEASE ORAL at 21:32

## 2022-06-22 RX ADMIN — HEPARIN SODIUM 9250 UNITS: 5000 INJECTION INTRAVENOUS; SUBCUTANEOUS at 22:44

## 2022-06-22 RX ADMIN — TROSPIUM CHLORIDE 20 MG: 20 TABLET, FILM COATED ORAL at 21:32

## 2022-06-22 RX ADMIN — Medication 10 ML: at 21:33

## 2022-06-22 RX ADMIN — Medication 4.5 MG: at 22:28

## 2022-06-22 RX ADMIN — HYDROCHLOROTHIAZIDE 25 MG: 25 TABLET ORAL at 21:32

## 2022-06-22 RX ADMIN — PANTOPRAZOLE SODIUM 40 MG: 40 TABLET, DELAYED RELEASE ORAL at 16:25

## 2022-06-22 NOTE — Clinical Note
vein. Accessed successfully. Micropuncture needle used. Using ultrasound guidance. Number of attempts =  2.  Left popliteal

## 2022-06-22 NOTE — ED NOTES
11:35 PM  Bedside and Verbal shift change report given to KEYONNA Whitlock RN (oncoming nurse) by KEYONNA Luevano RN (offgoing nurse). Report included the following information SBAR, Kardex, ED Summary, Intake/Output, MAR, Recent Results and Cardiac Rhythm NSR.     8:42 PM  Spoke to Dr. Elvin Nowak and obtianed verbal order for patient's home medications as patient had concerns as to why she has not received her evening dose of Trospium 20mg, melatonin 5mg , HCTZ 25mg, cymbalta 60mg, gabapentin 400mg.      6:15 PM  Patient was able to finally receive her dinner tray and eat , BS rechecked and stable. 4:29 PM  BS checked 72, pt provided with OJ and peanut butter crackers. Will recheck BS accordingly. 3:21 PM  Assumed care of patient, did not received report from an RN as patient was awaiting a room in waiting room. Upon assuming care of patient. Ordered labs samples obtained & sent to lab. Patient resting in stretcher in low and locked position, call bell within reach. Introduced self as primary nurse. Discussed plan of care with patient. Opportunity to ask questions given. Patient advised that medical information will be discussed and it is their own responsibility to let nurse know if such conversation should not take place in presence of visitors. Patient verbalizes understanding. Patient.  Denies any additional complaints at this time.

## 2022-06-22 NOTE — H&P
History and Physical  NAME: Gin Kelley  MRN: [id number]  YOB: 1950  AGE: 71 y.o.  female  SOCIAL SECURITY NUMBER: [id number]  Primary Care Provider: Adin Zendejas DO  CODE STATUS: Full Code      ASSESSMENT/PLAN:  1. Acute bilateral pulmonary emboli. Patient has been placed on heparin drip. Will request hematology consultation. Request echocardiogram to definitively rule out right heart strain. 2.  Acute extensive left lower extremity deep vein thrombosis. Heparin drip as above. Patient may actually need catheter directed thrombolysis? Defer to hematologist.  3.  Non-insulin-dependent diabetes mellitus. Sliding scale insulin. 4.  Fibromyalgia. History of Presenting Illness:   Gin Kelley is a 71 y.o. 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 Currently, her only project is caring for a 10year-old child with cerebral palsy who has a tracheostomy and PEG tube. On Monday, June 13, 2022, patient found herself especially winded, gasping with exertion, while taking care of the child. She states that she was so short of breath that she found herself dyspneic even while talking in a sitting position. She found herself draping over the childs bed to rest when she was standing up. She went to sit down. Patient states that the shortness of breath with exertion continued while she was at home. She spent most of the day in bed over the ensuing nine days when not at work. She continued to work. Patient did find herself more tired than usual at work, and occasionally she her heart rate seem to be mildly increased. She would notice mild racing heart sensation  only after exertion like getting her 10year old patient in the shower. She states that she has not had any recent travel, and has not had any recent surgeries. She states that she will started on Ozempic about five weeks ago.  The initial dose was 0.25 mg per week which patient took for the first two weeks, and then the dosage was increased to 0.5 mg per week which she took for another two weeks. She thought that maybe this new diabetic medication was making her feel tired. She states that she was getting a little weak wobbly when walking from 1 room to another. Patient stopped taking Ozempic over the last 1 week. Patient states that she did not have any chest pain, but did have pain in her shoulders which she  attributed to sleeping awkwardly. She denies having had any lower extremity pain other than chronic knee pain. Patient denies any calf redness or swelling. Patient denies any coughing. She does have a little bit of sinus congestion which is not unusual for her as she is prone to environmental allergies all year long. What was different was that she was feeling cold, which is somewhat unusual for her. Patient denies fever. Patient states that due to the generalized weakness, she had decreased PO intake over the past week. She was drinking a lot of Ginger ale. On Saturday, June 18, there was a period of about 30 minutes where she had vomiting and diarrhea. Patient look down at her legs, and  noticed that her left leg was larger than the right. It did cross her mind that she may have a blood clot in the left leg. On Sunday, June 19, patient continued noticing swelling of the left leg. At that point, it occurred to her that she may have a blood clot in her leg, and that it wasnt ozempic that was causing her weakness. She decided to follow up with her primary medical doctor today who referred her to the emergency department. The primary medical doctor also advised her to go down in the dosage of the Ozempic to 0.25 mg per week. Patient states that she has never had any blood clots before including in her extremities and her lungs. She denies any known clotting disorder in her family.            Review of Systems:  A comprehensive review of systems was negative except for that written in the History of Present Illness. Past Medical History:   Diagnosis Date    Chronic pain     fibromyalgia chronic bilateral knee pain    Diabetes ([facility]     Pre-diabetes    Fibromyalgia     GERD (gastroesophageal reflux disease)     Morbid obesity ([facility]         PAST SURGICAL HISTORY:   Past Surgical History:   Procedure Laterality Date    COLONOSCOPY N/A 12/14/2016    COLONOSCOPY / EGD  performed by Ciara Reza MD at [facility] HX HYSTERECTOMY      HX ORTHOPAEDIC  2005    left bunionectomy    HX SHOULDER REPLACEMENT Bilateral     right 2013 left 2015       Prior to Admission medications    Medication Sig Taking? Authorizing Provider   Cetirizine 10 mg cap Take 10 mg by mouth. Yes Other, MD Pankaj   aspirin delayed-release 81 mg tablet Take 81 mg by mouth daily. Yes Travis, MD Pankaj   folic acid-vit X5-TNM J48 ([place]) 2.5-25-1 mg tablet Take 1 Tablet by mouth daily. Yes Travis, MD Pankaj   trospium (SANCTURA) 20 mg tablet Take 20 mg by mouth Before breakfast and dinner. Yes Travis, MD Pankaj   diclofenac (VOLTAREN) 1 % gel Apply 2 g to affected area four (4) times daily. Yes Other, MD Pankaj   METHOTREXATE 20 mg by Does Not Apply route every seven (7) days. Yes Travis, MD Pankaj   semaglutide (Ozempic) 0.25 mg or 0.5 mg/dose (2 mg/1.5 ml) subq pen 0.25 mg by SubCUTAneous route every seven (7) days. Yes Other, MD Pankaj   golimumab ([address] ARIA IV) by IntraVENous route every two (2) months. Yes Other, MD Pankaj   melatonin 5 mg cap capsule Take 5 mg by mouth nightly. Yes Travis, MD Pankaj   gabapentin (NEURONTIN) 400 mg capsule Take 400 mg by mouth nightly. Yes Travis, MD Pankaj   budesonide/glycopyr/formoterol (BREZTRI AEROSPHERE IN) Take  by inhalation two (2) times a day. Yes Travis, MD Pankaj   pantoprazole (PROTONIX) 40 mg tablet Take 40 mg by mouth ACB/HS.  Yes Provider, Historical   traMADol (ULTRAM) 50 mg tablet Take 50 mg by mouth two (2) times a day. 50mg in morning  100mg at night  Indications: arthitis pain to left knee Yes Provider, Historical   acetaminophen (TYLENOL ARTHRITIS PAIN) 650 mg CR tablet Take 650 mg by mouth Before breakfast and dinner. 2 tablets in morning and 2 tablet in the evening  Indications: pain associated with arthritis Yes Provider, Historical   DULoxetine (CYMBALTA) 60 mg capsule 60 mg ACB/HS. Indications: disorder characterized by stiff, tender & painful muscles Yes Provider, Historical   hydrochlorothiazide (HYDRODIURIL) 25 mg tablet  Yes Provider, Historical       Allergies   Allergen Reactions    Oxycodone Rash     On chest and private area    Aspirin Rash     Rash in face and sone GI upset    Lamisil [Terbinafine] Other (comments)     Tingling around lip area    Sulfa (Sulfonamide Antibiotics) Other (comments)     Sores in throat    Sulfabenzamide Other (comments)        Family History   Problem Relation Age of Onset    Heart Disease Other     Stroke Other     Huntingtons disease Mother     Diabetes Brother     Arthritis-rheumatoid Maternal Grandmother         Social History     Tobacco Use    Smoking status: Former Smoker    Smokeless tobacco: Never Used   Substance Use Topics    Alcohol use: No    Drug use: No       Physical exam  Patient Vitals for the past 24 hrs:   BP Temp Pulse Resp SpO2  oxygen therapy   06/22/22 1902 (!) 120/48 98 °F (36.7 °C) 88 16 96 %  room air   06/22/22 1501 (!) 119/52 98.4 °F (36.9 °C) 70 19 100 %  room air   06/22/22 1226 108/64 99.4 °F (37.4 °C) 82 18 100 %  room air     Estimated body mass index is 42.43 kg/m² as calculated from the following:    Height as of this encounter: 5' 5\" (1.651 m). Weight as of this encounter: 115.7 kg (255 lb). General: In no acute distress. Well developed, well nourished. Head: Normocephalic, atraumatic. Eyes: Anicteric sclera. PERRL. Extraocular muscles intact.   ENT: External ears and nose appear normal.  Oral mucosa moist.  Neck: Supple. No jugular venous distention. Heart: Regular rate and rhythm. No murmurs appreciated. Chest: Symmetrical excursion. Clear to auscultation bilaterally. Abdomen: Soft, nontender. No abnormal distention. Bowel sounds are present throughout. Extremities: No gross deformities. Left leg is moderately larger than right; no cyanosis. Feet are warm to touch. Neurological: No lateralizing deficits. Alert, oriented X3. Skin: No jaundice. No rashes. Recent Results (from the past 24 hour(s))   CBC WITH AUTOMATED DIFF    Collection Time: 06/22/22 12:34 PM   Result Value Ref Range    WBC 6.4 3.6 - 11.0 K/uL    RBC 3.91 3.80 - 5.20 M/uL    HGB 11.9 11.5 - 16.0 g/dL    HCT 35.8 35.0 - 47.0 %    MCV 91.6 80.0 - 99.0 FL    MCH 30.4 26.0 - 34.0 PG    MCHC 33.2 30.0 - 36.5 g/dL    RDW 18.7 (H) 11.5 - 14.5 %    PLATELET 025 628 - 365 K/uL    MPV 9.8 8.9 - 12.9 FL    NRBC 0.0 0  WBC    ABSOLUTE NRBC 0.00 0.00 - 0.01 K/uL    NEUTROPHILS 71 32 - 75 %    LYMPHOCYTES 20 12 - 49 %    MONOCYTES 3 (L) 5 - 13 %    EOSINOPHILS 5 0 - 7 %    BASOPHILS 1 0 - 1 %    IMMATURE GRANULOCYTES 0 0.0 - 0.5 %    ABS. NEUTROPHILS 4.5 1.8 - 8.0 K/UL    ABS. LYMPHOCYTES 1.3 0.8 - 3.5 K/UL    ABS. MONOCYTES 0.2 0.0 - 1.0 K/UL    ABS. EOSINOPHILS 0.3 0.0 - 0.4 K/UL    ABS. BASOPHILS 0.0 0.0 - 0.1 K/UL    ABS. IMM.  GRANS. 0.0 0.00 - 0.04 K/UL    DF AUTOMATED     METABOLIC PANEL, COMPREHENSIVE    Collection Time: 06/22/22 12:34 PM   Result Value Ref Range    Sodium 137 136 - 145 mmol/L    Potassium 3.9 3.5 - 5.1 mmol/L    Chloride 102 97 - 108 mmol/L    CO2 28 21 - 32 mmol/L    Anion gap 7 5 - 15 mmol/L    Glucose 105 (H) 65 - 100 mg/dL    BUN 21 (H) 6 - 20 MG/DL    Creatinine 0.95 0.55 - 1.02 MG/DL    BUN/Creatinine ratio 22 (H) 12 - 20      GFR est AA >60 >60 ml/min/1.73m2    GFR est non-AA 58 (L) >60 ml/min/1.73m2    Calcium 9.0 8.5 - 10.1 MG/DL    Bilirubin, total 1.0 0.2 - 1.0 MG/DL    ALT (SGPT) 26 12 - 78 U/L    AST (SGOT) 35 15 - 37 U/L    Alk. phosphatase 77 45 - 117 U/L    Protein, total 7.5 6.4 - 8.2 g/dL    Albumin 3.0 (L) 3.5 - 5.0 g/dL    Globulin 4.5 (H) 2.0 - 4.0 g/dL    A-G Ratio 0.7 (L) 1.1 - 2.2     MAGNESIUM    Collection Time: 06/22/22 12:34 PM   Result Value Ref Range    Magnesium 1.8 1.6 - 2.4 mg/dL   NT-PRO BNP    Collection Time: 06/22/22 12:34 PM   Result Value Ref Range    NT pro-BNP 78 <125 PG/ML   TROPONIN-HIGH SENSITIVITY    Collection Time: 06/22/22 12:34 PM   Result Value Ref Range    Troponin-High Sensitivity 7 0 - 51 ng/L   EKG, 12 LEAD, INITIAL    Collection Time: 06/22/22 12:51 PM   Result Value Ref Range    Ventricular Rate 80 BPM    Atrial Rate 80 BPM    P-R Interval 188 ms    QRS Duration 78 ms    Q-T Interval 372 ms    QTC Calculation (Bezet) 429 ms    Calculated P Axis 25 degrees    Calculated R Axis -16 degrees    Calculated T Axis 4 degrees    Diagnosis       Normal sinus rhythm  Cannot rule out Anterior infarct , age undetermined  Abnormal ECG  When compared with ECG of 10-SEP-2020 22:48,  Nonspecific T wave abnormality now evident in Anterior leads     PTT    Collection Time: 06/22/22  3:18 PM   Result Value Ref Range    aPTT 24.2 22.1 - 31.0 sec    aPTT, therapeutic range     58.0 - 77.0 SECS   CBC WITH AUTOMATED DIFF    Collection Time: 06/22/22  3:23 PM   Result Value Ref Range    WBC 5.2 3.6 - 11.0 K/uL    RBC 3.47 (L) 3.80 - 5.20 M/uL    HGB 10.6 (L) 11.5 - 16.0 g/dL    HCT 31.9 (L) 35.0 - 47.0 %    MCV 91.9 80.0 - 99.0 FL    MCH 30.5 26.0 - 34.0 PG    MCHC 33.2 30.0 - 36.5 g/dL    RDW 18.5 (H) 11.5 - 14.5 %    PLATELET 525 279 - 088 K/uL    MPV 9.4 8.9 - 12.9 FL    NRBC 0.0 0  WBC    ABSOLUTE NRBC 0.00 0.00 - 0.01 K/uL    NEUTROPHILS 64 32 - 75 %    LYMPHOCYTES 26 12 - 49 %    MONOCYTES 3 (L) 5 - 13 %    EOSINOPHILS 6 0 - 7 %    BASOPHILS 1 0 - 1 %    IMMATURE GRANULOCYTES 0 0.0 - 0.5 %    ABS. NEUTROPHILS 3.3 1.8 - 8.0 K/UL    ABS. LYMPHOCYTES 1.3 0.8 - 3.5 K/UL    ABS. MONOCYTES 0.2 0.0 - 1.0 K/UL    ABS. EOSINOPHILS 0.3 0.0 - 0.4 K/UL    ABS. BASOPHILS 0.0 0.0 - 0.1 K/UL    ABS. IMM. GRANS. 0.0 0.00 - 0.04 K/UL    DF AUTOMATED     GLUCOSE, POC    Collection Time: 06/22/22  4:19 PM   Result Value Ref Range    Glucose (POC) 72 65 - 117 mg/dL    Performed by [facility], POC    Collection Time: 06/22/22  4:39 PM   Result Value Ref Range    Glucose (POC) 75 65 - 117 mg/dL    Performed by Marcia Locke    GLUCOSE, POC    Collection Time: 06/22/22  5:01 PM   Result Value Ref Range    Glucose (POC) 88 65 - 117 mg/dL    Performed by [address] [place] [address], POC    Collection Time: 06/22/22  6:13 PM   Result Value Ref Range    Glucose (POC) 99 65 - 117 mg/dL    Performed by [facility]    PTT    Collection Time: 06/22/22  9:30 PM   Result Value Ref Range    aPTT 32.3 (H) 22.1 - 31.0 sec    aPTT, therapeutic range     58.0 - 77.0 SECS       CTA CHEST W OR W WO CONT  Narrative: EXAM: CTA CHEST W OR W WO CONT    INDICATION: Left lower extremity swelling. Shortness of breath and diaphoresis. rule out PE    COMPARISON: None. CONTRAST: 100 mL of Isovue-370. TECHNIQUE:   Precontrast  images were obtained to localize the volume for acquisition. Multislice helical CT arteriography was performed from the diaphragm to the  thoracic inlet during uneventful rapid bolus intravenous contrast  administration. Lung and soft tissue windows were generated. Coronal and  sagittal images were generated and 3D post processing consisting of coronal  maximum intensity images was performed. CT dose reduction was achieved through  use of a standardized protocol tailored for this examination and automatic  exposure control for dose modulation. FINDINGS:  The lungs are clear of mass, nodule, airspace disease or edema.     The pulmonary arteries are well enhanced and no pulmonary emboli are there is a  moderate-sized saddle type embolus in the right main pulmonary artery, extending  into the right lower lobe. There is also a smaller embolism at the origin of the  pulmonary artery to the left lower lobe (series 2, images 74 and 61). There is  no bowing of the interventricular septum. There is no mediastinal or hilar adenopathy or mass. The aorta enhances normally  without evidence of aneurysm or dissection. The visualized portions of the upper abdominal organs are normal.  Impression: Bilateral pulmonary emboli, larger on the right. No evidence for peripheral infarction or right heart strain  This result was verbally relayed by me to Dr. Aryan Bell at 1358 hours  789      Bilateral lower extremity Doppler:  Right Lower Venous    For comparison purposes, the right common femoral vein was briefly interrogated. The vein demonstrates normal color filling and compressibility. Doppler flow was phasic and spontaneous. Left Lower Venous    Technically difficult exam due to: marked edema and tissue density. Acute occlusive thrombus present in the left common femoral vein. Acute occlusive thrombus present in the left great saphenous thigh vein. Acute occlusive thrombus present in the left saphenofemoral junction. Acute occlusive thrombus present in the left profunda femoral vein. Acute occlusive thrombus present in the left mid femoral vein. Acute occlusive thrombus present in the left distal femoral vein. Acute occlusive thrombus present in the left popliteal vein. The posterior tibial and peroneal vein(s) were not well visualized.

## 2022-06-22 NOTE — ED PROVIDER NOTES
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 She states recently she was at work and had an episode of significant tachypnea and diaphoresis. States that she was barely able to finish taking care of her patient at that time. She states she assumed that her sugars were low because she just started a medication for her diabetes. States that she was not able to check her sugars at that time as she did not have a glucometer. States that she then noticed that her left leg was significantly swollen. She was referred here by her primary care doctor to rule out PE and DVT. Denies previous episode of same and denies any recent travel or procedures or surgeries. Denies feeling short of breath at this time.            Past Medical History:   Diagnosis Date    Chronic pain     fibromyalgia chronic bilateral knee pain    Diabetes (HCC)     Pre-diabetes    Fibromyalgia     GERD (gastroesophageal reflux disease)     Morbid obesity (HCC)        Past Surgical History:   Procedure Laterality Date    COLONOSCOPY N/A 12/14/2016    COLONOSCOPY / EGD  performed by Sai Han MD at [address] [place] [address] HX HYSTERECTOMY      HX ORTHOPAEDIC  2005    left bunionectomy    HX SHOULDER REPLACEMENT Bilateral     right 2013 left 2015         Family History:   Problem Relation Age of Onset    Heart Disease Other     Stroke Other        Social History     Socioeconomic History    Marital status:      Spouse name: Not on file    Number of children: Not on file    Years of education: Not on file    Highest education level: Not on file   Occupational History    Not on file   Tobacco Use    Smoking status: Former Smoker    Smokeless tobacco: Never Used   Substance and Sexual Activity    Alcohol use: No    Drug use: No    Sexual activity: Not on file   Other Topics Concern    Not on file   Social History Narrative    Not on file     Social Determinants of Health     Financial Resource Strain:     Difficulty of Paying Living Expenses: Not on file   Food Insecurity:     Worried About [address] in the Last Year: Not on file    Lorenza of Food in the Last Year: Not on file   Transportation Needs:     Lack of Transportation (Medical): Not on file    Lack of Transportation (Non-Medical): Not on file   Physical Activity:     Days of Exercise per Week: Not on file    Minutes of Exercise per Session: Not on file   Stress:     Feeling of Stress : Not on file   Social Connections:     Frequency of Communication with Friends and Family: Not on file    Frequency of Social Gatherings with Friends and Family: Not on file    Attends Rastafarian Services: Not on file    Active Member of [address] [place] [address] or Organizations: Not on file    Attends Club or Organization Meetings: Not on file    Marital Status: Not on file   Intimate Partner Violence:     Fear of Current or Ex-Partner: Not on file    Emotionally Abused: Not on file    Physically Abused: Not on file    Sexually Abused: Not on file   Housing Stability:     Unable to Pay for Housing in the Last Year: Not on file    Number of [place] in the Last Year: Not on file    Unstable Housing in the Last Year: Not on file         ALLERGIES: Oxycodone, Aspirin, Lamisil [terbinafine], Sulfa (sulfonamide antibiotics), and Sulfabenzamide    Review of Systems   Constitutional: Positive for diaphoresis. Negative for fever. HENT: Negative for drooling. Respiratory: Positive for shortness of breath. Cardiovascular: Positive for leg swelling. Negative for chest pain. Gastrointestinal: Negative for abdominal pain. Genitourinary: Negative for dysuria. Musculoskeletal: Negative for back pain. Neurological: Positive for light-headedness. Negative for seizures. Psychiatric/Behavioral: Negative. Vitals:    06/22/22 1226   BP: 108/64   Pulse: 82   Resp: 18   Temp: 99.4 °F (37.4 °C)   SpO2: 100%   Weight: 115.7 kg (255 lb)   Height: 5' 5\" (1.651 m)            Physical Exam  Vitals and nursing note reviewed. Constitutional:       Appearance: Normal appearance. She is obese. She is not toxic-appearing or diaphoretic. HENT:      Head: Normocephalic and atraumatic. Right Ear: External ear normal.      Left Ear: External ear normal.      Nose: Nose normal.   Eyes:      General: No scleral icterus. Cardiovascular:      Rate and Rhythm: Normal rate. Pulses: Normal pulses. Heart sounds: No friction rub. Pulmonary:      Effort: Pulmonary effort is normal.      Breath sounds: Normal breath sounds. Abdominal:      Palpations: Abdomen is soft. Musculoskeletal:         General: Tenderness present. Cervical back: Neck supple. Right lower leg: No edema. Left lower leg: Edema present. Skin:     General: Skin is warm and dry. Neurological:      Mental Status: She is oriented to person, place, and time. Psychiatric:         Mood and Affect: Mood normal.         Behavior: Behavior normal.         Thought Content: Thought content normal.         Judgment: Judgment normal.          [facility]  ED Course as of 06/22/22 1338   Wed Jun 22, 2022   1304 EKG obtained at 1251 showing sinus rhythm, rate 80, normal intervals, nonspecific T wave inversion in lead III, not significantly changed compared to prior EKG. [JE]      ED Course User Index  [JE] Salvador Zaragoza MD       Procedures        Perfect Serve Consult for Admission  2:49 PM    ED Room Number: ER04/04  Patient Name and age:  David Lafleur 71 y.o.  female  Working Diagnosis:   1. Acute deep vein thrombosis (DVT) of proximal vein of left lower extremity ([address]    2.  Multiple subsegmental pulmonary emboli without acute cor pulmonale ([address]        COVID-19 Suspicion:  no  Sepsis present:  no  Reassessment needed: no  Code Status: Full Code  Readmission: no  Isolation Requirements:  no  Recommended Level of Care:  telemetry  Department:[facility] ED - [phone]  Other: Patient is a 29-year-old female who presents emergency department for evaluation of episode of shortness of breath and diaphoresis associated with left leg swelling. Patient with extensive clot noted in left leg but pulses intact. Also with bilateral PE without evidence of pulmonary infarct or right heart strain. No prior similar episodes. Vital signs stable.

## 2022-06-22 NOTE — ED TRIAGE NOTES
Patient arrives with swelling to LLE onset Saturday. Reports the week of the 13th she had episode of becoming acutely SOB and diaphoretic with generalized weakness that lasted a week. Continues with some exertional SOB since. States she didn't think about a blood clot, thought it was Ozempic which was new blood sugar medication. PCP saw her today and referred her here for DVT.

## 2022-06-23 LAB
APTT PPP: 125.4 SEC (ref 22.1–31)
APTT PPP: 44.9 SEC (ref 22.1–31)
APTT PPP: 79.5 SEC (ref 22.1–31)
APTT PPP: >130 SEC (ref 22.1–31)
APTT PPP: >130 SEC (ref 22.1–31)
ATRIAL RATE: 80 BPM
CALCULATED P AXIS, ECG09: 25 DEGREES
CALCULATED R AXIS, ECG10: -16 DEGREES
CALCULATED T AXIS, ECG11: 4 DEGREES
DIAGNOSIS, 93000: NORMAL
GLUCOSE BLD STRIP.AUTO-MCNC: 65 MG/DL (ref 65–117)
GLUCOSE BLD STRIP.AUTO-MCNC: 79 MG/DL (ref 65–117)
GLUCOSE BLD STRIP.AUTO-MCNC: 91 MG/DL (ref 65–117)
GLUCOSE BLD STRIP.AUTO-MCNC: 98 MG/DL (ref 65–117)
P-R INTERVAL, ECG05: 188 MS
Q-T INTERVAL, ECG07: 372 MS
QRS DURATION, ECG06: 78 MS
QTC CALCULATION (BEZET), ECG08: 429 MS
SERVICE CMNT-IMP: NORMAL
THERAPEUTIC RANGE,PTTT: ABNORMAL SECS (ref 58–77)
VENTRICULAR RATE, ECG03: 80 BPM

## 2022-06-23 PROCEDURE — 99223 1ST HOSP IP/OBS HIGH 75: CPT | Performed by: INTERNAL MEDICINE

## 2022-06-23 PROCEDURE — 06CD3ZZ EXTIRPATION OF MATTER FROM LEFT COMMON ILIAC VEIN, PERCUTANEOUS APPROACH: ICD-10-PCS

## 2022-06-23 PROCEDURE — 37252 INTRVASC US NONCORONARY 1ST: CPT

## 2022-06-23 PROCEDURE — 74011250636 HC RX REV CODE- 250/636

## 2022-06-23 PROCEDURE — C1753 CATH, INTRAVAS ULTRASOUND: HCPCS

## 2022-06-23 PROCEDURE — 36415 COLL VENOUS BLD VENIPUNCTURE: CPT

## 2022-06-23 PROCEDURE — 99153 MOD SED SAME PHYS/QHP EA: CPT

## 2022-06-23 PROCEDURE — 65270000029 HC RM PRIVATE

## 2022-06-23 PROCEDURE — 74011000258 HC RX REV CODE- 258: Performed by: EMERGENCY MEDICINE

## 2022-06-23 PROCEDURE — 37187 VENOUS MECH THROMBECTOMY: CPT

## 2022-06-23 PROCEDURE — 74011250637 HC RX REV CODE- 250/637: Performed by: INTERNAL MEDICINE

## 2022-06-23 PROCEDURE — 74011000250 HC RX REV CODE- 250

## 2022-06-23 PROCEDURE — 74011250636 HC RX REV CODE- 250/636: Performed by: EMERGENCY MEDICINE

## 2022-06-23 PROCEDURE — 74011000250 HC RX REV CODE- 250: Performed by: INTERNAL MEDICINE

## 2022-06-23 PROCEDURE — C1769 GUIDE WIRE: HCPCS

## 2022-06-23 PROCEDURE — 82962 GLUCOSE BLOOD TEST: CPT

## 2022-06-23 PROCEDURE — 77030029065 HC DRSG HEMO QCLOT ZMED -B

## 2022-06-23 PROCEDURE — C1894 INTRO/SHEATH, NON-LASER: HCPCS

## 2022-06-23 PROCEDURE — 06CN3ZZ EXTIRPATION OF MATTER FROM LEFT FEMORAL VEIN, PERCUTANEOUS APPROACH: ICD-10-PCS

## 2022-06-23 PROCEDURE — 85730 THROMBOPLASTIN TIME PARTIAL: CPT

## 2022-06-23 PROCEDURE — 77030002986 HC SUT PROL J&J -A

## 2022-06-23 PROCEDURE — 2709999900 HC NON-CHARGEABLE SUPPLY

## 2022-06-23 PROCEDURE — 99152 MOD SED SAME PHYS/QHP 5/>YRS: CPT

## 2022-06-23 PROCEDURE — 77030004561 HC CATH ANGI DX COBRA ANGI -B

## 2022-06-23 PROCEDURE — 74011000636 HC RX REV CODE- 636

## 2022-06-23 PROCEDURE — 75710 ARTERY X-RAYS ARM/LEG: CPT

## 2022-06-23 RX ORDER — LIDOCAINE HYDROCHLORIDE 10 MG/ML
10 INJECTION INFILTRATION; PERINEURAL
Status: DISCONTINUED | OUTPATIENT
Start: 2022-06-23 | End: 2022-06-23 | Stop reason: HOSPADM

## 2022-06-23 RX ORDER — INSULIN LISPRO 100 [IU]/ML
INJECTION, SOLUTION INTRAVENOUS; SUBCUTANEOUS
Status: DISCONTINUED | OUTPATIENT
Start: 2022-06-23 | End: 2022-06-24 | Stop reason: HOSPADM

## 2022-06-23 RX ORDER — HEPARIN SODIUM 200 [USP'U]/100ML
500 INJECTION, SOLUTION INTRAVENOUS ONCE
Status: COMPLETED | OUTPATIENT
Start: 2022-06-23 | End: 2022-06-23

## 2022-06-23 RX ORDER — FENTANYL CITRATE 50 UG/ML
25-200 INJECTION, SOLUTION INTRAMUSCULAR; INTRAVENOUS
Status: DISCONTINUED | OUTPATIENT
Start: 2022-06-23 | End: 2022-06-23 | Stop reason: HOSPADM

## 2022-06-23 RX ORDER — MIDAZOLAM HYDROCHLORIDE 1 MG/ML
.5-1 INJECTION, SOLUTION INTRAMUSCULAR; INTRAVENOUS
Status: DISCONTINUED | OUTPATIENT
Start: 2022-06-23 | End: 2022-06-23 | Stop reason: HOSPADM

## 2022-06-23 RX ORDER — HEPARIN SODIUM 1000 [USP'U]/ML
1000-8000 INJECTION, SOLUTION INTRAVENOUS; SUBCUTANEOUS
Status: DISCONTINUED | OUTPATIENT
Start: 2022-06-23 | End: 2022-06-23 | Stop reason: HOSPADM

## 2022-06-23 RX ADMIN — THERA TABS 1 TABLET: TAB at 08:03

## 2022-06-23 RX ADMIN — GABAPENTIN 400 MG: 100 CAPSULE ORAL at 22:00

## 2022-06-23 RX ADMIN — HEPARIN SODIUM 14 UNITS/KG/HR: 10000 INJECTION INTRAVENOUS; SUBCUTANEOUS at 09:29

## 2022-06-23 RX ADMIN — PANTOPRAZOLE SODIUM 40 MG: 40 TABLET, DELAYED RELEASE ORAL at 08:03

## 2022-06-23 RX ADMIN — HYDROCHLOROTHIAZIDE 25 MG: 25 TABLET ORAL at 22:00

## 2022-06-23 RX ADMIN — TROSPIUM CHLORIDE 20 MG: 20 TABLET, FILM COATED ORAL at 22:00

## 2022-06-23 RX ADMIN — Medication 10 ML: at 22:00

## 2022-06-23 RX ADMIN — DULOXETINE HYDROCHLORIDE 60 MG: 30 CAPSULE, DELAYED RELEASE ORAL at 22:02

## 2022-06-23 RX ADMIN — TROSPIUM CHLORIDE 20 MG: 20 TABLET, FILM COATED ORAL at 08:04

## 2022-06-23 RX ADMIN — DULOXETINE HYDROCHLORIDE 60 MG: 30 CAPSULE, DELAYED RELEASE ORAL at 08:03

## 2022-06-23 RX ADMIN — HEPARIN SODIUM 14 UNITS/KG/HR: 10000 INJECTION INTRAVENOUS; SUBCUTANEOUS at 17:00

## 2022-06-23 RX ADMIN — PANTOPRAZOLE SODIUM 40 MG: 40 TABLET, DELAYED RELEASE ORAL at 17:49

## 2022-06-23 RX ADMIN — Medication 4.5 MG: at 22:21

## 2022-06-23 NOTE — PROGRESS NOTES
Patient off floor to obtain thrombectomy. No needs at this time.  Patient NPO since breakfast, around 0830

## 2022-06-23 NOTE — PROGRESS NOTES
Problem: Falls - Risk of  Goal: *Absence of Falls  Description: Document Shira Roger Fall Risk and appropriate interventions in the flowsheet.   Outcome: Progressing Towards Goal  Note: Fall Risk Interventions:            Medication Interventions: Bed/chair exit alarm,Patient to call before getting OOB

## 2022-06-23 NOTE — PROGRESS NOTES
CARE MANAGEMENT INITIAL ASSESSEMENT      NAME:   Ariane Da Silva   :     1950   MRN:     [id number]       Emergency Contact:  Extended Emergency Contact Information  Primary Emergency Contact: Cruz Woodruff  Address: [address] [place] [address] Phone: [phone]  Mobile Phone: [phone]  Relation: Spouse  Secondary Emergency Contact: Bev Rodrigez  Mobile Phone: [phone]  Relation: Daughter   needed? No    Advance Directive:  Full Code, does not have an advance directive. Healthcare Decision Maker:   Ada Macisa- spouse- [phone]    Reason for Admission:  Ms. Miriam Solis is a 71 y.o. female with history that includes arthritis, DM and GERD  who was emergently admitted for:  acute bilateral pulmonary emboli    Patient Active Problem List   Diagnosis Code    Bilateral pulmonary embolism (HCC) I26.99       Assessment: In person with patient and spouse Sergey Marques . RUR:  8%  Risk Level:  Low  Value-based purchasing:   No  Bundle patient:  No    Residency:  Private residence  Exterior Steps:  5  Interior Steps:  None    Lives With:  Spouse/Significant Other and adult children (daughter)    Prior functioning:  Independent.   Patient requires assistance with:  N/A    Prior DME required:  Cane and CPAP    DME available:  Same as above    Rehab history:  None    Discharge Concerns/Barriers Identified:  None identified      Insurer:  Payor: Severiano Quinton / Plan: [place] MEDICARE PART A & B / Product Type: Medicare /     PCP: Dm Noble DO   Name of Practice:  [facility]   Current patient: Yes   Approximate date of last visit: Wednesday   Access to virtual PCP visits:  Unknown    Pharmacy:  [facility]   Financial/Difficulty affording medications:  None identified    COVID-19 vaccination status:  Fully vaccinated + booster    DC Transport:  Family      Transition of care plan:  Home with outpatient follow-up Comments:   Pt admitted on 6/22/22 for acute bilateral pulmonary embolism. CM met with Pt and spouse to complete initial assessment. Pt states that she lives at home with spouse and daughter. Pt has no hx of HH or home O2. Pt uses a CPAP and cane. Pt is independent with ADLs. Pt denies any problems with ADLs. Pt reports she has arthritis in her knees which can make ambulation difficult/painful. Pt uses a cane to assist with ambulation. Pt denies any recent falls. Pt works PT. Pt is able to drive. Pt will require outpatient Eliquis. MD e-prescribed. CM called pharmacy. Spoke with Mohinder Keys at [place] [address]. Cost will be $75.44. Pt updated. CM will provided Eliquis coupon at discharge. Discharge plan is for Pt to return home. Family will transport Pt home.   _____________________________________  Goran Franco [address] [place] [address] [facility]  Available via [address] [place] [address]  6/23/2022   11:48 AM      Care Management Interventions  PCP Verified by CM: Yes Yuli Fraga DO)  Mode of Transport at Discharge:  Other (see comment) (family)  Transition of Care Consult (CM Consult): Discharge Planning  MyChart Signup: No  Discharge Durable Medical Equipment: No  Physical Therapy Consult: No  Occupational Therapy Consult: No  Speech Therapy Consult: No  Support Systems: Spouse/Significant Other,Child(reza)  Confirm Follow Up Transport: Self  Discharge Location  Patient Expects to be Discharged to[de-identified] Home with outpatient services

## 2022-06-23 NOTE — PROGRESS NOTES
1:00 PM  From the ER    1:42 PM  TRANSFER - OUT REPORT:    Verbal report given to Odette Jenkins on Jeremi Jo  being transferred to cath  (unit) for ordered procedure       Report consisted of patients Situation, Background, Assessment and   Recommendations(SBAR). Information from the following report(s) SBAR was reviewed with the receiving nurse. Lines:   Peripheral IV 06/22/22 Right Antecubital (Active)   Site Assessment Clean, dry, & intact 06/23/22 0800   Phlebitis Assessment 0 06/23/22 0800   Infiltration Assessment 0 06/23/22 0800   Dressing Status Clean, dry, & intact 06/23/22 0800   Dressing Type Transparent 06/23/22 0800   Hub Color/Line Status Flushed;Patent 06/23/22 0800   Action Taken Open ports on tubing capped 06/23/22 0800   Alcohol Cap Used Yes 06/23/22 0800       Peripheral IV 06/22/22 Anterior;Distal;Right Forearm (Active)   Site Assessment Clean, dry, & intact 06/23/22 0800   Phlebitis Assessment 0 06/23/22 0800   Infiltration Assessment 0 06/23/22 0800   Dressing Status Clean, dry, & intact 06/23/22 0800   Dressing Type Transparent 06/23/22 0800   Hub Color/Line Status Pink;Flushed;Patent 06/23/22 0800   Action Taken Open ports on tubing capped 06/23/22 0800   Alcohol Cap Used Yes 06/23/22 0800        Opportunity for questions and clarification was provided. Patient transported with:   Registered Nurse      4:09 PM  TRANSFER - IN REPORT:    Verbal report received from Akua Miller on Jeremi Jo  being received from cath lab(unit) for routine post - op      Report consisted of patients Situation, Background, Assessment and   Recommendations(SBAR). Information from the following report(s) SBAR and Procedure Summary was reviewed with the receiving nurse. Opportunity for questions and clarification was provided. Assessment completed upon patients arrival to unit and care assumed.        4:25 PM  blood sugar 65    4:34 PM  Blood sugar 79    TRANSFER - OUT REPORT:    Verbal report given to Rhiannon(name) on David Lafleur  being transferred to cath lab(unit) for post         Report consisted of patients Situation, Background, Assessment and   Recommendations(SBAR). Information from the following report(s) SBAR and Procedure Summary was reviewed with the receiving nurse. Lines:   Peripheral IV 06/22/22 Right Antecubital (Active)   Site Assessment Clean, dry, & intact 06/23/22 0800   Phlebitis Assessment 0 06/23/22 0800   Infiltration Assessment 0 06/23/22 0800   Dressing Status Clean, dry, & intact 06/23/22 0800   Dressing Type Transparent 06/23/22 0800   Hub Color/Line Status Flushed;Patent 06/23/22 0800   Action Taken Open ports on tubing capped 06/23/22 0800   Alcohol Cap Used Yes 06/23/22 0800       Peripheral IV 06/22/22 Anterior;Distal;Right Forearm (Active)   Site Assessment Clean, dry, & intact 06/23/22 0800   Phlebitis Assessment 0 06/23/22 0800   Infiltration Assessment 0 06/23/22 0800   Dressing Status Clean, dry, & intact 06/23/22 0800   Dressing Type Transparent 06/23/22 0800   Hub Color/Line Status Pink;Flushed;Patent 06/23/22 0800   Action Taken Open ports on tubing capped 06/23/22 0800   Alcohol Cap Used Yes 06/23/22 0800        Opportunity for questions and clarification was provided. Patient transported with:   Registered Nurse      Nurse at bedside to assess patient site and rate verification.

## 2022-06-23 NOTE — PROGRESS NOTES
Problem: Falls - Risk of  Goal: *Absence of Falls  Description: Document Shannon Mcburney Fall Risk and appropriate interventions in the flowsheet.   Outcome: Progressing Towards Goal  Note: Fall Risk Interventions:  Mobility Interventions: Communicate number of staff needed for ambulation/transfer         Medication Interventions: Bed/chair exit alarm    Elimination Interventions: Call light in reach              Problem: Pain  Goal: *Control of Pain  Outcome: Progressing Towards Goal

## 2022-06-23 NOTE — CONSULTS
[facility] at [place] [address] [address] [facility] [address] [facility] [place]: [phone]  F: [phone]      Reason for Visit:   Michael Marie is a 71 y.o. female who is seen for evaluation of acute bilateral pulmonary emboli; extensive left leg deep vein thrombosis. History of Present Illness:   Michael Marie is a pleasant 71 y.o. female who was admitted on 6/22/22 with SOB at rest and exertion. ED imaging CTA reveals bilateral pulmonary emboli. LLE venous doppler positive fro acute DVT involving left common, proximal/mid/distal femoral, SFJ abd popliteal veins. Pt was admitted and started on heparin gtt. She noted extreme SOB on 6/13/22 while at work, but no similar episodes afterwards. She noted left leg pain and swelling, but thought it may be due to her osteoarthritis. She then developed n/v/d over the weekend. She saw her PCP on Wed who directed her to ED. She received a spinal injection with Dr. Damon Foster in May 2022. She does have RA and was switched to Simponi injections in 4/2022 under the care of Dr. Wandy Cody. She is on Ozempic for Type II DM. She has a daughter with h/o RUE DVT. She denies any long 8hr trips, but states she did drive to [facility] in March 2022. No recent surgeries, fractures, use of hormone replacement. She denies any smoking or EtOH use. Pt has no prior h/o VTE. Vascular surgery evaluated and recommends thrombectomy given extensive LLE DVT.  at bedside.      Past Medical History:   Diagnosis Date    Chronic pain     fibromyalgia chronic bilateral knee pain    Diabetes (HCC)     Fibromyalgia     GERD (gastroesophageal reflux disease)     Morbid obesity (HCC)        Past Surgical History:   Procedure Laterality Date    COLONOSCOPY N/A 12/14/2016    COLONOSCOPY / EGD  performed by Roseanne Eaton MD at [address] [place] [address] HX HYSTERECTOMY      HX ORTHOPAEDIC  2005    left bunionectomy    HX SHOULDER REPLACEMENT Bilateral right 2013 left 2015       Social History     Socioeconomic History    Marital status:      Spouse name: Not on file    Number of children: Not on file    Years of education: Not on file    Highest education level: Not on file   Occupational History    Not on file   Tobacco Use    Smoking status: Former Smoker    Smokeless tobacco: Never Used   Substance and Sexual Activity    Alcohol use: No    Drug use: No    Sexual activity: Not on file   Other Topics Concern     Service Not Asked    Blood Transfusions Not Asked    Caffeine Concern Not Asked    Occupational Exposure Not Asked    Hobby Hazards Not Asked    Sleep Concern Not Asked    Stress Concern Not Asked    Weight Concern Not Asked    Special Diet Not Asked    Back Care Not Asked    Exercise Not Asked    Bike Helmet Not Asked   [address] Not Asked    Self-Exams Not Asked   Social History Narrative    Not on file     Social Determinants of Health     Financial Resource Strain:     Difficulty of Paying Living Expenses: Not on file   Food Insecurity:     Worried About Running Out of Food in the Last Year: Not on file    Lorenza of Food in the Last Year: Not on file   Transportation Needs:     Lack of Transportation (Medical): Not on file    Lack of Transportation (Non-Medical):  Not on file   Physical Activity:     Days of Exercise per Week: Not on file    Minutes of Exercise per Session: Not on file   Stress:     Feeling of Stress : Not on file   Social Connections:     Frequency of Communication with Friends and Family: Not on file    Frequency of Social Gatherings with Friends and Family: Not on file    Attends Buddhist Services: Not on file    Active Member of Clubs or Organizations: Not on file    Attends Club or Organization Meetings: Not on file    Marital Status: Not on file   Intimate Partner Violence:     Fear of Current or Ex-Partner: Not on file    Emotionally Abused: Not on file    Physically Abused: Not on file    Sexually Abused: Not on file   Housing Stability:     Unable to Pay for Housing in the Last Year: Not on file    Number of Places Lived in the Last Year: Not on file    Unstable Housing in the Last Year: Not on file       Family History   Problem Relation Age of Onset    Huntingtons disease Mother     Diabetes Brother     Arthritis-rheumatoid Maternal Grandmother        Current Facility-Administered Medications   Medication Dose Route Frequency    heparin 25,000 units in D5W 250 ml infusion  13-36 Units/kg/hr IntraVENous TITRATE    sodium chloride (NS) flush 5-40 mL  5-40 mL IntraVENous Q8H    sodium chloride (NS) flush 5-40 mL  5-40 mL IntraVENous PRN    acetaminophen (TYLENOL) tablet 650 mg  650 mg Oral Q6H PRN    therapeutic multivitamin (THERAGRAN) tablet 1 Tablet  1 Tablet Oral DAILY    pantoprazole (PROTONIX) tablet 40 mg  40 mg Oral ACB&D    glucose chewable tablet 16 g  4 Tablet Oral PRN    dextrose 10% infusion 0-250 mL  0-250 mL IntraVENous PRN    glucagon (GLUCAGEN) injection 1 mg  1 mg IntraMUSCular PRN    insulin lispro (HUMALOG) injection   SubCUTAneous TIDAC    DULoxetine (CYMBALTA) capsule 60 mg  60 mg Oral ACB/HS    gabapentin (NEURONTIN) capsule 400 mg  400 mg Oral QHS    hydroCHLOROthiazide (HYDRODIURIL) tablet 25 mg  25 mg Oral QHS    melatonin tablet 4.5 mg  4.5 mg Oral QHS PRN    trospium (SANCTURA) tablet 20 mg  20 mg Oral ACB/HS    heparin (porcine) 5,000 unit/mL injection         Current Outpatient Medications   Medication Sig    apixaban (Eliquis) 5 mg tablet PLEASE TAKE 2 TABS TWICE DAILY FOR 7 DAYS THEN CHANGE TO 1 TAB TWICE DAILY THEREAFTER    Cetirizine 10 mg cap Take 10 mg by mouth.  folic acid-vit H0-FZZ G08 (Folbee) 2.5-25-1 mg tablet Take 1 Tablet by mouth daily.  trospium (SANCTURA) 20 mg tablet Take 20 mg by mouth Before breakfast and dinner.  METHOTREXATE 20 mg by Does Not Apply route every seven (7) days.     semaglutide (Ozempic) 0.25 mg or 0.5 mg/dose (2 mg/1.5 ml) subq pen 0.25 mg by SubCUTAneous route every seven (7) days.  golimumab ([place] [address] ARIA IV) by IntraVENous route every two (2) months.  melatonin 5 mg cap capsule Take 5 mg by mouth nightly.  gabapentin (NEURONTIN) 400 mg capsule Take 400 mg by mouth nightly.  budesonide/glycopyr/formoterol (BREZTRI AEROSPHERE IN) Take  by inhalation two (2) times a day.  pantoprazole (PROTONIX) 40 mg tablet Take 40 mg by mouth ACB/HS.  traMADol (ULTRAM) 50 mg tablet Take 50 mg by mouth two (2) times a day. 50mg in morning  100mg at night  Indications: arthitis pain to left knee    acetaminophen (TYLENOL ARTHRITIS PAIN) 650 mg CR tablet Take 650 mg by mouth Before breakfast and dinner. 2 tablets in morning and 2 tablet in the evening  Indications: pain associated with arthritis    DULoxetine (CYMBALTA) 60 mg capsule 60 mg ACB/HS. Indications: disorder characterized by stiff, tender & painful muscles    hydrochlorothiazide (HYDRODIURIL) 25 mg tablet        Allergies   Allergen Reactions    Oxycodone Rash     On chest and private area    Aspirin Rash     Rash in face and sone GI upset    Lamisil [Terbinafine] Other (comments)     Tingling around lip area    Sulfa (Sulfonamide Antibiotics) Other (comments)     Sores in throat    Sulfabenzamide Other (comments)          Review of Systems: A complete review of systems was obtained, reviewed. Pertinent findings reviewed above.     Physical Exam:     Visit Vitals  BP (!) 115/46 (BP 1 Location: Left upper arm, BP Patient Position: At rest)   Pulse 78   Temp 98.1 °F (36.7 °C)   Resp 24   Ht 5' 5\" (1.651 m)   Wt 255 lb (115.7 kg)   SpO2 96%   BMI 42.43 kg/m²     ECOG PS: 1  General: obese; no distress  Eyes: PERRLA, anicteric sclerae  HENT: atraumatic, oropharynx clear  Neck: supple, no JVD  Lymphatic: no cervical, supraclavicular, or axillary adenopathy  Respiratory: anteriorly CTAB, normal respiratory effort  CV: normal rate, regular rhythm, no murmurs, LLE edema noted  GI: soft, nontender, nondistended, no masses, no hepatomegaly, no splenomegaly  MS: digits without clubbing or cyanosis. Skin: no rashes, no ecchymoses, no petechia. normal temperature, turgor, and texture. Psych: alert, oriented, appropriate affect, normal judgment/insight    Results:     Lab Results   Component Value Date/Time    WBC 5.2 06/22/2022 03:23 PM    HGB 10.6 (L) 06/22/2022 03:23 PM    HCT 31.9 (L) 06/22/2022 03:23 PM    PLATELET 274 87/29/9204 03:23 PM    MCV 91.9 06/22/2022 03:23 PM    ABS. NEUTROPHILS 3.3 06/22/2022 03:23 PM     Lab Results   Component Value Date/Time    Sodium 137 06/22/2022 12:34 PM    Potassium 3.9 06/22/2022 12:34 PM    Chloride 102 06/22/2022 12:34 PM    CO2 28 06/22/2022 12:34 PM    Glucose 105 (H) 06/22/2022 12:34 PM    BUN 21 (H) 06/22/2022 12:34 PM    Creatinine 0.95 06/22/2022 12:34 PM    GFR est AA >60 06/22/2022 12:34 PM    GFR est non-AA 58 (L) 06/22/2022 12:34 PM    Calcium 9.0 06/22/2022 12:34 PM    Glucose (POC) 99 06/22/2022 06:13 PM     Lab Results   Component Value Date/Time    Bilirubin, total 1.0 06/22/2022 12:34 PM    ALT (SGPT) 26 06/22/2022 12:34 PM    Alk.  phosphatase 77 06/22/2022 12:34 PM    Protein, total 7.5 06/22/2022 12:34 PM    Albumin 3.0 (L) 06/22/2022 12:34 PM    Globulin 4.5 (H) 06/22/2022 12:34 PM     No results found for: RETIC, PSAT, FE, TIBC, FERR, B12LT, FOL, RBCF2L, [id number], COSLT, HOMCY, MMALT, HAPT, HAPGB, PCTDBS, ERYTLT, LDH, B2MSLT, SR, ESRA, CRP, 484262, 276354, ERYTLT, TSH, SPE6L, [place], 396237, LPSE, HCGAI, HCGAT, [id number], HHIVI, RHIV12, [id number], H12L2, TSHEXT    Lab Results   Component Value Date/Time    aPTT 79.5 (H) 06/23/2022 08:13 AM    D-dimer 0.50 09/10/2020 11:32 PM     No results found for: CEA, 099019, C199LT, C125, JBAM251, 2729LT, C153LT, PSA, LDH, [id number], HCGTLT, HCGN, AFP, CHRGLT, CHRGA     6/22/2022 Duplex LE venous  Left lower extremity venous duplex positive for acute, occlusive deep venous thrombosis in the left common femoral, SFJ, profunda femoral, proximal/mid/distal femoral, and popliteal veins. Left lower extremity duplex positive for acute, occlusive superficial thrombophlebitis in the thigh GSV. Right common femoral vein is thrombus free. 6/22/22 Chest CTA:  IMPRESSION  Bilateral pulmonary emboli, larger on the right. No evidence for peripheral infarction or right heart strain  This result was verbally relayed by me to Dr. Isaiah Swanson at 46 hours 1    Assessment/Recommendations:    70 yo female with PMHx of type II DM, RA admitted with unprovoked bilateral PE and LLE DVT. 1. Bilateral PE / LLE DVT:  Unprovoked and would benefit from anticoagulation indefinitely. Discussed pros/cons of testing for inherited thrombophilia. At her age, it is unlikely to yield any helpful information and results would not change her management. Discussed we could revisit this discussion as outpatient in a few months since testing is not advised in the acute setting of thrombosis. Vascular surgery evaluated and plans for thrombectomy this pm. ECHO: pending   -- Agree with heparin gtt for now while procedures are planned. -- Can transition to [address] once procedures are completed and no more invasive procedures planned. -- Recommend continuing on anticoagulation indefinitely  -- Outpatient follow up with Hematology in 2-3 months. 2. Type II DM:   On Ozempic at home. Hospital medicine managing ISS while inpatient. 3. Rheumatoid arthritis:  On Simponi injections under care of Dr. Idalmis Carter. I do not see any data concerning any thrombotic risk with this. 4. HTN:   Controlled on HTN     I personally saw and evaluated the patient and performed the key components of medical decision making. The history, physical exam, and documentation were performed by Joy Villalba NP.   I reviewed and verified the above documentation and modified it as needed. Pt has unprovoked PE and LLE DVT. I recommend anticoagulation indefinitely. No real need for thrombophilia testing.      Signed By: Kumar Jerez MD

## 2022-06-23 NOTE — PROGRESS NOTES
Problem: Falls - Risk of  Goal: *Absence of Falls  Description: Document Iain Olson Fall Risk and appropriate interventions in the flowsheet.   Outcome: Progressing Towards Goal  Note: Fall Risk Interventions:  Mobility Interventions: Bed/chair exit alarm,Communicate number of staff needed for ambulation/transfer,Patient to call before getting OOB         Medication Interventions: Bed/chair exit alarm,Evaluate medications/consider consulting pharmacy,Patient to call before getting OOB    Elimination Interventions: Bed/chair exit alarm,Call light in reach,Patient to call for help with toileting needs              Problem: Pain  Goal: *Control of Pain  Outcome: Progressing Towards Goal

## 2022-06-23 NOTE — PROGRESS NOTES
Percutaneous thrombectomy done by me without appreciable difficulty or complication  Clot wit both acute and chronic characteristics. Continue heparin drip for now  Scott Lucass to transition to [facility] tomorrow and discharge  Follow up with me in 2-3 weeks.

## 2022-06-23 NOTE — PROGRESS NOTES
Robinson Garrido thiago K[address]  [address] [facility], [address] [facility]  [phone]      Medical Progress Note      NAME: Nacho Pérez   :  1950  MRM:  [id number]    Date/Time: 2022  2:30 PM         Subjective:     Chief Complaint: \"I'm okay\"   Pt seen and examined. Denies CP, SOB at rest but does have CHOWDHURY and LLE swelling. No pain. No provoking history     ROS:  (bold if positive, if negative)           Objective:       Vitals:          Last 24hrs VS reviewed since prior progress note.  Most recent are:    Visit Vitals  BP (!) 115/46 (BP 1 Location: Left upper arm, BP Patient Position: At rest)   Pulse 78   Temp 98.1 °F (36.7 °C)   Resp 24   Ht 5' 5\" (1.651 m)   Wt 115.7 kg (255 lb)   SpO2 96%   BMI 42.43 kg/m²     SpO2 Readings from Last 6 Encounters:   22 96%   20 98%   16 98%   16 98%   16 96%    O2 Flow Rate (L/min): 2 l/min   No intake or output data in the 24 hours ending 22 1430       Exam:     Physical Exam:    Gen:  Well-developed, well-nourished, in no acute distress  HEENT:  Pink conjunctivae, PERRL, hearing intact to voice, moist mucous membranes  Neck:  Supple, without masses, thyroid non-tender  Resp:  No accessory muscle use, clear breath sounds without wheezes rales or rhonchi  Card:  No murmurs, normal S1, S2 without thrills, bruits or peripheral edema  Abd:  Soft, non-tender, non-distended, normoactive bowel sounds are present  Musc:  No cyanosis or clubbing  Skin:  No rashes or ulcers, skin turgor is good  Neuro:  Cranial nerves 3-12 are grossly intact,  strength is 5/5 bilaterally and dorsi / plantarflexion is 5/5 bilaterally, follows commands appropriately  Psych:  Good insight, oriented to person, place and time, alert  LLE > RLE     Medications Reviewed: (see below)    Lab Data Reviewed: (see below)    ______________________________________________________________________    Medications:     Current Facility-Administered Medications   Medication Dose Route Frequency    fentaNYL citrate (PF) injection  mcg   mcg IntraVENous Multiple    midazolam (VERSED) injection 0.5-10 mg  0.5-10 mg IntraVENous Multiple    lidocaine (XYLOCAINE) 10 mg/mL (1 %) injection 10 mL  10 mL SubCUTAneous Multiple    iopamidoL (ISOVUE-370) 76 % injection 100-200 mL  100-200 mL IntraVENous Multiple    heparin (porcine) 1,000 unit/mL injection 1,000-8,000 Units  1,000-8,000 Units IntraVENous Multiple    heparin 25,000 units in D5W 250 ml infusion  13-36 Units/kg/hr IntraVENous TITRATE    sodium chloride (NS) flush 5-40 mL  5-40 mL IntraVENous Q8H    sodium chloride (NS) flush 5-40 mL  5-40 mL IntraVENous PRN    acetaminophen (TYLENOL) tablet 650 mg  650 mg Oral Q6H PRN    therapeutic multivitamin (THERAGRAN) tablet 1 Tablet  1 Tablet Oral DAILY    pantoprazole (PROTONIX) tablet 40 mg  40 mg Oral ACB&D    glucose chewable tablet 16 g  4 Tablet Oral PRN    dextrose 10% infusion 0-250 mL  0-250 mL IntraVENous PRN    glucagon (GLUCAGEN) injection 1 mg  1 mg IntraMUSCular PRN    insulin lispro (HUMALOG) injection   SubCUTAneous TIDAC    DULoxetine (CYMBALTA) capsule 60 mg  60 mg Oral ACB/HS    gabapentin (NEURONTIN) capsule 400 mg  400 mg Oral QHS    hydroCHLOROthiazide (HYDRODIURIL) tablet 25 mg  25 mg Oral QHS    melatonin tablet 4.5 mg  4.5 mg Oral QHS PRN    trospium (SANCTURA) tablet 20 mg  20 mg Oral ACB/HS            Lab Review:     Recent Labs     06/22/22  1523 06/22/22  1234   WBC 5.2 6.4   HGB 10.6* 11.9   HCT 31.9* 35.8    266     Recent Labs     06/22/22  1234      K 3.9      CO2 28   *   BUN 21*   CREA 0.95   CA 9.0   MG 1.8   ALB 3.0*   ALT 26     No components found for: GLPOC         Assessment / Plan:   Bilateral PE/LLE DVT - UNprovoked  -on heparin gtt  -appreciate vascular eval; plans for thrombectomy of LLE today   -transition to eliquis at discharge   - consulted for Eliquis coupon/pricing     HTN   -on HCTZ     DM   -ISS   -BG checks TID and qHS     Fibromyalagia  -on Cymbalta and Neurontin     Morbid obesity - BMI 42  -encourage weight loss     Total time spent with patient: 28 [address]95 [address] discussed with: Patient, Vascular MD, CM     Discussed:  Care Plan    Prophylaxis:  Heparin gtt    Disposition:  Home w/Family           ___________________________________________________    Attending Physician: Davis Barriga MD

## 2022-06-23 NOTE — ROUTINE PROCESS
Bedside shift change report given to Priyanka Lucas (oncoming nurse) by Jazmin Finley (offgoing nurse). Report included the following information SBAR, Kardex, Intake/Output, MAR, Accordion and Recent Results.

## 2022-06-24 ENCOUNTER — APPOINTMENT (OUTPATIENT)
Dept: NON INVASIVE DIAGNOSTICS | Age: 72
DRG: 270 | End: 2022-06-24
Attending: INTERNAL MEDICINE
Payer: MEDICARE

## 2022-06-24 VITALS
RESPIRATION RATE: 16 BRPM | WEIGHT: 255.07 LBS | TEMPERATURE: 98.6 F | BODY MASS INDEX: 42.5 KG/M2 | HEART RATE: 71 BPM | HEIGHT: 65 IN | SYSTOLIC BLOOD PRESSURE: 109 MMHG | DIASTOLIC BLOOD PRESSURE: 59 MMHG | OXYGEN SATURATION: 96 %

## 2022-06-24 LAB
ANION GAP SERPL CALC-SCNC: 8 MMOL/L (ref 5–15)
APTT PPP: 46.9 SEC (ref 22.1–31)
BASOPHILS # BLD: 0 K/UL (ref 0–0.1)
BASOPHILS NFR BLD: 0 % (ref 0–1)
BUN SERPL-MCNC: 12 MG/DL (ref 6–20)
BUN/CREAT SERPL: 15 (ref 12–20)
CALCIUM SERPL-MCNC: 8.4 MG/DL (ref 8.5–10.1)
CHLORIDE SERPL-SCNC: 103 MMOL/L (ref 97–108)
CO2 SERPL-SCNC: 28 MMOL/L (ref 21–32)
CREAT SERPL-MCNC: 0.81 MG/DL (ref 0.55–1.02)
DIFFERENTIAL METHOD BLD: ABNORMAL
ECHO AO ARCH DIAM: 2.4 CM
ECHO AO ASC DIAM: 3.1 CM
ECHO AO ASCENDING AORTA INDEX: 1.42 CM/M2
ECHO AV AREA PEAK VELOCITY: 3.4 CM2
ECHO AV AREA/BSA PEAK VELOCITY: 1.6 CM2/M2
ECHO AV PEAK GRADIENT: 8 MMHG
ECHO AV PEAK VELOCITY: 1.4 M/S
ECHO AV VELOCITY RATIO: 0.79
ECHO LA DIAMETER INDEX: 1.28 CM/M2
ECHO LA DIAMETER: 2.8 CM
ECHO LA VOL 2C: 23 ML (ref 22–52)
ECHO LA VOL 4C: 16 ML (ref 22–52)
ECHO LA VOL BP: 20 ML (ref 22–52)
ECHO LA VOL/BSA BIPLANE: 9 ML/M2 (ref 16–34)
ECHO LA VOLUME AREA LENGTH: 21 ML
ECHO LA VOLUME INDEX A2C: 11 ML/M2 (ref 16–34)
ECHO LA VOLUME INDEX A4C: 7 ML/M2 (ref 16–34)
ECHO LA VOLUME INDEX AREA LENGTH: 10 ML/M2 (ref 16–34)
ECHO LV E' LATERAL VELOCITY: 10 CM/S
ECHO LV E' SEPTAL VELOCITY: 11 CM/S
ECHO LV EDV A2C: 79 ML
ECHO LV EDV A4C: 141 ML
ECHO LV EDV BP: 108 ML (ref 56–104)
ECHO LV EDV INDEX A4C: 64 ML/M2
ECHO LV EDV INDEX BP: 49 ML/M2
ECHO LV EDV NDEX A2C: 36 ML/M2
ECHO LV EJECTION FRACTION A2C: 71 %
ECHO LV EJECTION FRACTION A4C: 75 %
ECHO LV EJECTION FRACTION BIPLANE: 74 % (ref 55–100)
ECHO LV ESV A2C: 23 ML
ECHO LV ESV A4C: 35 ML
ECHO LV ESV BP: 28 ML (ref 19–49)
ECHO LV ESV INDEX A2C: 11 ML/M2
ECHO LV ESV INDEX A4C: 16 ML/M2
ECHO LV ESV INDEX BP: 13 ML/M2
ECHO LV FRACTIONAL SHORTENING: 42 % (ref 28–44)
ECHO LV INTERNAL DIMENSION DIASTOLE INDEX: 2.19 CM/M2
ECHO LV INTERNAL DIMENSION DIASTOLIC: 4.8 CM (ref 3.9–5.3)
ECHO LV INTERNAL DIMENSION SYSTOLIC INDEX: 1.28 CM/M2
ECHO LV INTERNAL DIMENSION SYSTOLIC: 2.8 CM
ECHO LV IVSD: 0.6 CM (ref 0.6–0.9)
ECHO LV MASS 2D: 106.9 G (ref 67–162)
ECHO LV MASS INDEX 2D: 48.8 G/M2 (ref 43–95)
ECHO LV POSTERIOR WALL DIASTOLIC: 0.8 CM (ref 0.6–0.9)
ECHO LV RELATIVE WALL THICKNESS RATIO: 0.33
ECHO LVOT AREA: 4.2 CM2
ECHO LVOT DIAM: 2.3 CM
ECHO LVOT PEAK GRADIENT: 5 MMHG
ECHO LVOT PEAK VELOCITY: 1.1 M/S
ECHO MV A VELOCITY: 0.71 M/S
ECHO MV E DECELERATION TIME (DT): 206.7 MS
ECHO MV E VELOCITY: 0.75 M/S
ECHO MV E/A RATIO: 1.06
ECHO MV E/E' LATERAL: 7.5
ECHO MV E/E' RATIO (AVERAGED): 7.16
ECHO MV E/E' SEPTAL: 6.82
ECHO PULMONARY ARTERY END DIASTOLIC PRESSURE: 2 MMHG
ECHO PV MAX VELOCITY: 0.9 M/S
ECHO PV PEAK GRADIENT: 3 MMHG
ECHO PV REGURGITANT MAX VELOCITY: 0.7 M/S
ECHO RV INTERNAL DIMENSION: 2.5 CM
ECHO RV TAPSE: 1.6 CM (ref 1.7–?)
ECHO TV REGURGITANT MAX VELOCITY: 2.23 M/S
ECHO TV REGURGITANT PEAK GRADIENT: 20 MMHG
EOSINOPHIL # BLD: 0.2 K/UL (ref 0–0.4)
EOSINOPHIL NFR BLD: 4 % (ref 0–7)
ERYTHROCYTE [DISTWIDTH] IN BLOOD BY AUTOMATED COUNT: 18.4 % (ref 11.5–14.5)
FACT VIII ACT/NOR PPP: 236 % (ref 80–200)
GLUCOSE BLD STRIP.AUTO-MCNC: 114 MG/DL (ref 65–117)
GLUCOSE SERPL-MCNC: 122 MG/DL (ref 65–100)
HCT VFR BLD AUTO: 30.3 % (ref 35–47)
HGB BLD-MCNC: 10.1 G/DL (ref 11.5–16)
IMM GRANULOCYTES # BLD AUTO: 0 K/UL
IMM GRANULOCYTES NFR BLD AUTO: 0 %
LYMPHOCYTES # BLD: 2.5 K/UL (ref 0.8–3.5)
LYMPHOCYTES NFR BLD: 47 % (ref 12–49)
MCH RBC QN AUTO: 30.2 PG (ref 26–34)
MCHC RBC AUTO-ENTMCNC: 33.3 G/DL (ref 30–36.5)
MCV RBC AUTO: 90.7 FL (ref 80–99)
MONOCYTES # BLD: 0.4 K/UL (ref 0–1)
MONOCYTES NFR BLD: 7 % (ref 5–13)
NEUTS SEG # BLD: 2.3 K/UL (ref 1.8–8)
NEUTS SEG NFR BLD: 42 % (ref 32–75)
NRBC # BLD: 0 K/UL (ref 0–0.01)
NRBC BLD-RTO: 0 PER 100 WBC
PLATELET # BLD AUTO: 242 K/UL (ref 150–400)
PMV BLD AUTO: 9.5 FL (ref 8.9–12.9)
POTASSIUM SERPL-SCNC: 3.3 MMOL/L (ref 3.5–5.1)
RBC # BLD AUTO: 3.34 M/UL (ref 3.8–5.2)
RBC MORPH BLD: ABNORMAL
SERVICE CMNT-IMP: NORMAL
SODIUM SERPL-SCNC: 139 MMOL/L (ref 136–145)
THERAPEUTIC RANGE,PTTT: ABNORMAL SECS (ref 58–77)
WBC # BLD AUTO: 5.4 K/UL (ref 3.6–11)
WBC MORPH BLD: ABNORMAL

## 2022-06-24 PROCEDURE — 74011250636 HC RX REV CODE- 250/636: Performed by: EMERGENCY MEDICINE

## 2022-06-24 PROCEDURE — 99232 SBSQ HOSP IP/OBS MODERATE 35: CPT | Performed by: INTERNAL MEDICINE

## 2022-06-24 PROCEDURE — 85300 ANTITHROMBIN III ACTIVITY: CPT

## 2022-06-24 PROCEDURE — 85730 THROMBOPLASTIN TIME PARTIAL: CPT

## 2022-06-24 PROCEDURE — 85598 HEXAGNAL PHOSPH PLTLT NEUTRL: CPT

## 2022-06-24 PROCEDURE — 81240 F2 GENE: CPT

## 2022-06-24 PROCEDURE — 81241 F5 GENE: CPT

## 2022-06-24 PROCEDURE — 74011000258 HC RX REV CODE- 258: Performed by: EMERGENCY MEDICINE

## 2022-06-24 PROCEDURE — 74011250636 HC RX REV CODE- 250/636: Performed by: INTERNAL MEDICINE

## 2022-06-24 PROCEDURE — 80048 BASIC METABOLIC PNL TOTAL CA: CPT

## 2022-06-24 PROCEDURE — 93306 TTE W/DOPPLER COMPLETE: CPT

## 2022-06-24 PROCEDURE — 85732 THROMBOPLASTIN TIME PARTIAL: CPT

## 2022-06-24 PROCEDURE — 85025 COMPLETE CBC W/AUTO DIFF WBC: CPT

## 2022-06-24 PROCEDURE — 85302 CLOT INHIBIT PROT C ANTIGEN: CPT

## 2022-06-24 PROCEDURE — 82962 GLUCOSE BLOOD TEST: CPT

## 2022-06-24 PROCEDURE — 36415 COLL VENOUS BLD VENIPUNCTURE: CPT

## 2022-06-24 PROCEDURE — 74011250637 HC RX REV CODE- 250/637: Performed by: INTERNAL MEDICINE

## 2022-06-24 PROCEDURE — 93306 TTE W/DOPPLER COMPLETE: CPT | Performed by: STUDENT IN AN ORGANIZED HEALTH CARE EDUCATION/TRAINING PROGRAM

## 2022-06-24 PROCEDURE — 85305 CLOT INHIBIT PROT S TOTAL: CPT

## 2022-06-24 PROCEDURE — 74011000250 HC RX REV CODE- 250: Performed by: INTERNAL MEDICINE

## 2022-06-24 PROCEDURE — 85613 RUSSELL VIPER VENOM DILUTED: CPT

## 2022-06-24 PROCEDURE — 85240 CLOT FACTOR VIII AHG 1 STAGE: CPT

## 2022-06-24 RX ORDER — HEPARIN SODIUM 1000 [USP'U]/ML
40 INJECTION, SOLUTION INTRAVENOUS; SUBCUTANEOUS ONCE
Status: COMPLETED | OUTPATIENT
Start: 2022-06-24 | End: 2022-06-24

## 2022-06-24 RX ADMIN — HEPARIN SODIUM 4630 UNITS: 1000 INJECTION INTRAVENOUS; SUBCUTANEOUS at 05:13

## 2022-06-24 RX ADMIN — APIXABAN 10 MG: 5 TABLET, FILM COATED ORAL at 09:24

## 2022-06-24 RX ADMIN — Medication 10 ML: at 06:29

## 2022-06-24 RX ADMIN — DULOXETINE HYDROCHLORIDE 60 MG: 30 CAPSULE, DELAYED RELEASE ORAL at 06:27

## 2022-06-24 RX ADMIN — THERA TABS 1 TABLET: TAB at 09:24

## 2022-06-24 RX ADMIN — PANTOPRAZOLE SODIUM 40 MG: 40 TABLET, DELAYED RELEASE ORAL at 06:28

## 2022-06-24 RX ADMIN — HEPARIN SODIUM 13 UNITS/KG/HR: 10000 INJECTION INTRAVENOUS; SUBCUTANEOUS at 05:13

## 2022-06-24 RX ADMIN — TROSPIUM CHLORIDE 20 MG: 20 TABLET, FILM COATED ORAL at 06:27

## 2022-06-24 NOTE — PROGRESS NOTES
[address] June 24, 2022       RE: Saniya Ryan      To Whom It May Concern,    This is to certify that Saniya Ryan was hospitalized from 6/22 to 6/24. She may return to work as late as 6/11 if needed    Please feel free to contact my office if you have any questions or concerns. Thank you for your assistance in this matter.       Sincerely,  Janki Hudson MD  [phone]

## 2022-06-24 NOTE — DISCHARGE SUMMARY
Physician Discharge Summary     Patient ID:  Ronnette Buerger  [id number]  8 y.o.  1950    Admit date: 6/22/2022    Discharge date and time: 6/24/2022    Admission Diagnoses: Bilateral pulmonary embolism [facility]) [I26.99]    Discharge Diagnoses/Hospital Course   Ms. Lindsey Knox is a 72 yo AAF with PMH of HTN, DM, fibromyalgia admitted for bilateral PE's and LLE DVT. Unprovoked. She was started on heparin gtt and evaluated by vascular surgery. She is s/p thrombectomy of the LLE. She will d/c on eliquis     PCP: Cameron Ramos DO     Consults: Hematology/Oncology and Vascular Surgery    Discharge Exam:  Visit Vitals  BP (!) 109/59   Pulse 71   Temp 98.6 °F (37 °C)   Resp 16   Ht 5' 5\" (1.651 m)   Wt 115.7 kg (255 lb 1.2 oz)   SpO2 96%   BMI 42.45 kg/m²     Gen:  Well-developed, well-nourished, in no acute distress  HEENT:  Pink conjunctivae, PERRL, hearing intact to voice, moist mucous membranes  Neck:  Supple, without masses, thyroid non-tender  Resp:  No accessory muscle use, clear breath sounds without wheezes rales or rhonchi  Card:  No murmurs, normal S1, S2 without thrills, bruits or peripheral edema  Abd:  Soft, non-tender, non-distended, normoactive bowel sounds are present  Musc:  No cyanosis or clubbing  Skin:  No rashes or ulcers, skin turgor is good  Neuro:  Cranial nerves 3-12 are grossly intact,  strength is 5/5 bilaterally and dorsi / plantarflexion is 5/5 bilaterally, follows commands appropriately  Psych:  Good insight, oriented to person, place and time, alert  LLE > RLE     Disposition: home    Patient Instructions:   Current Discharge Medication List      START taking these medications    Details   apixaban (Eliquis) 5 mg tablet PLEASE TAKE 2 TABS TWICE DAILY FOR 7 DAYS THEN CHANGE TO 1 TAB TWICE DAILY THEREAFTER  Qty: 60 Tablet, Refills: 1         CONTINUE these medications which have NOT CHANGED    Details   Cetirizine 10 mg cap Take 10 mg by mouth.       folic acid-vit O6-ADO L95 Vashti Fair) 2.5-25-1 mg tablet Take 1 Tablet by mouth daily. trospium (SANCTURA) 20 mg tablet Take 20 mg by mouth Before breakfast and dinner. METHOTREXATE 20 mg by Does Not Apply route every seven (7) days. semaglutide (Ozempic) 0.25 mg or 0.5 mg/dose (2 mg/1.5 ml) subq pen 0.25 mg by SubCUTAneous route every seven (7) days. golimumab ([place] [address] ARIA IV) by IntraVENous route every two (2) months.      melatonin 5 mg cap capsule Take 5 mg by mouth nightly.      gabapentin (NEURONTIN) 400 mg capsule Take 400 mg by mouth nightly. budesonide/glycopyr/formoterol (BREZTRI AEROSPHERE IN) Take  by inhalation two (2) times a day. pantoprazole (PROTONIX) 40 mg tablet Take 40 mg by mouth ACB/HS. traMADol (ULTRAM) 50 mg tablet Take 50 mg by mouth two (2) times a day. 50mg in morning  100mg at night  Indications: arthitis pain to left knee      acetaminophen (TYLENOL ARTHRITIS PAIN) 650 mg CR tablet Take 650 mg by mouth Before breakfast and dinner. 2 tablets in morning and 2 tablet in the evening  Indications: pain associated with arthritis      DULoxetine (CYMBALTA) 60 mg capsule 60 mg ACB/HS.  Indications: disorder characterized by stiff, tender & painful muscles  Refills: 3      hydrochlorothiazide (HYDRODIURIL) 25 mg tablet Refills: 98         STOP taking these medications       aspirin delayed-release 81 mg tablet Comments:   Reason for Stopping:         diclofenac (VOLTAREN) 1 % gel Comments:   Reason for Stopping:         multivitamin (ONE A DAY) tablet Comments:   Reason for Stopping:             Activity: Activity as tolerated  Diet: Resume previous diet  Wound Care: None needed    Follow-up with Robert Keating, DO   Follow-up Information     Follow up With Specialties Details Why Contact Info    Rayray Camacho, Konrad Baldemar [facility]  [phone]            Approximate time spent in patient care on day of discharge: 35 minutes Signed:  Thermon Moritz, MD  6/24/2022  3:41 PM

## 2022-06-24 NOTE — PROGRESS NOTES
[facility] at [facility]  [address] [facility]  [place]: [phone]  F: [phone]      Reason for Visit:   Gin Kelley is a 71 y.o. female who is seen for evaluation of acute bilateral pulmonary emboli; extensive left leg deep vein thrombosis. History of Present Illness:   Gin Kelley is a pleasant 71 y.o. female who was admitted on 6/22/22 with SOB at rest and exertion. ED imaging CTA reveals bilateral pulmonary emboli. LLE venous doppler positive fro acute DVT involving left common, proximal/mid/distal femoral, SFJ abd popliteal veins. Pt was admitted and started on heparin gtt. She noted extreme SOB on 6/13/22 while at work, but no similar episodes afterwards. She noted left leg pain and swelling, but thought it may be due to her osteoarthritis. She then developed n/v/d over the weekend. She saw her PCP on Wed who directed her to ED. She received a spinal injection with Dr. Robyn Ospina in May 2022. She does have RA and was switched to Simponi injections in 4/2022 under the care of Dr. Srinivas Rodriguez. She is on Ozempic for Type II DM. She has a daughter with h/o RUE DVT. She denies any long 8hr trips, but states she did drive to [facility] in March 2022. No recent surgeries, fractures, use of hormone replacement. She denies any smoking or EtOH use. Pt has no prior h/o VTE. Vascular surgery evaluated and recommends thrombectomy given extensive LLE DVT.  at bedside. Interval History:  Pt states she tolerated thrombectomy procedure well. She denies any current CP, SOB, bruising/bleeding. Denies any significant left leg pain. Reports that her daughter has SLE and developed RUE DVT and R leg swelling after a trip to [place].      PMHx: Chronic pain, DM, Fibromyalgia, RA, GERD    Current Facility-Administered Medications   Medication Dose Route Frequency    apixaban (ELIQUIS) tablet 10 mg  10 mg Oral BID    insulin lispro (HUMALOG) injection SubCUTAneous AC&HS    sodium chloride (NS) flush 5-40 mL  5-40 mL IntraVENous Q8H    sodium chloride (NS) flush 5-40 mL  5-40 mL IntraVENous PRN    acetaminophen (TYLENOL) tablet 650 mg  650 mg Oral Q6H PRN    therapeutic multivitamin (THERAGRAN) tablet 1 Tablet  1 Tablet Oral DAILY    pantoprazole (PROTONIX) tablet 40 mg  40 mg Oral ACB&D    glucose chewable tablet 16 g  4 Tablet Oral PRN    dextrose 10% infusion 0-250 mL  0-250 mL IntraVENous PRN    glucagon (GLUCAGEN) injection 1 mg  1 mg IntraMUSCular PRN    DULoxetine (CYMBALTA) capsule 60 mg  60 mg Oral ACB/HS    gabapentin (NEURONTIN) capsule 400 mg  400 mg Oral QHS    [Held by provider] hydroCHLOROthiazide (HYDRODIURIL) tablet 25 mg  25 mg Oral QHS    melatonin tablet 4.5 mg  4.5 mg Oral QHS PRN    trospium (SANCTURA) tablet 20 mg  20 mg Oral ACB/HS       Allergies   Allergen Reactions    Oxycodone Rash     On chest and private area    Aspirin Rash     Rash in face and sone GI upset    Lamisil [Terbinafine] Other (comments)     Tingling around lip area    Sulfa (Sulfonamide Antibiotics) Other (comments)     Sores in throat    Sulfabenzamide Other (comments)          Review of Systems: A complete review of systems was obtained, reviewed. Pertinent findings reviewed above. Physical Exam:     Visit Vitals  BP (!) 109/59   Pulse 71   Temp 98.6 °F (37 °C)   Resp 16   Ht 5' 5\" (1.651 m)   Wt 255 lb 1.2 oz (115.7 kg)   SpO2 96%   BMI 42.45 kg/m²     ECOG PS: 1  General: obese; no distress  Eyes: PERRLA, anicteric sclerae  HENT: atraumatic, oropharynx clear  Neck: supple, no JVD  Lymphatic: no cervical, supraclavicular, or axillary adenopathy  Respiratory: anteriorly CTAB, normal respiratory effort  CV: normal rate, regular rhythm, no murmurs, LLE edema noted  GI: soft, nontender, nondistended, no masses, no hepatomegaly, no splenomegaly  MS: digits without clubbing or cyanosis.   Skin: no rashes, no ecchymoses, no petechia. normal temperature, turgor, and texture. Psych: alert, oriented, appropriate affect, normal judgment/insight    Results:     Lab Results   Component Value Date/Time    WBC 5.4 06/24/2022 09:15 AM    HGB 10.1 (L) 06/24/2022 09:15 AM    HCT 30.3 (L) 06/24/2022 09:15 AM    PLATELET 293 93/31/0925 09:15 AM    MCV 90.7 06/24/2022 09:15 AM    ABS. NEUTROPHILS 2.3 06/24/2022 09:15 AM     Lab Results   Component Value Date/Time    Sodium 139 06/24/2022 09:15 AM    Potassium 3.3 (L) 06/24/2022 09:15 AM    Chloride 103 06/24/2022 09:15 AM    CO2 28 06/24/2022 09:15 AM    Glucose 122 (H) 06/24/2022 09:15 AM    BUN 12 06/24/2022 09:15 AM    Creatinine 0.81 06/24/2022 09:15 AM    GFR est AA >60 06/24/2022 09:15 AM    GFR est non-AA >60 06/24/2022 09:15 AM    Calcium 8.4 (L) 06/24/2022 09:15 AM    Glucose (POC) 114 06/24/2022 12:15 PM     Lab Results   Component Value Date/Time    Bilirubin, total 1.0 06/22/2022 12:34 PM    ALT (SGPT) 26 06/22/2022 12:34 PM    Alk. phosphatase 77 06/22/2022 12:34 PM    Protein, total 7.5 06/22/2022 12:34 PM    Albumin 3.0 (L) 06/22/2022 12:34 PM    Globulin 4.5 (H) 06/22/2022 12:34 PM     No results found for: RETIC, PSAT, FE, TIBC, FERR, B12LT, FOL, RBCF2L, GAQ113401, COSLT, HOMCY, MMALT, HAPT, HAPGB, PCTDBS, ERYTLT, LDH, B2MSLT, SR, ESRA, CRP, 223930, 281823, ERYTLT, TSH, SPE6L, Zahra Mower, 159808, LPSE, HCGAI, HCGAT, [id number], HHIVI, RHIV12, FFH261065, H12L2, TSHEXT, TSHEXT    Lab Results   Component Value Date/Time    aPTT 46.9 (H) 06/24/2022 03:59 AM    D-dimer 0.50 09/10/2020 11:32 PM     No results found for: CEA, 762829, C199LT, C125, UMVY257, 2729LT, C153LT, PSA, LDH, [id number], HCGTLT, HCGN, AFP, CHRGLT, CHRGA     6/22/2022 Duplex LE venous  Left lower extremity venous duplex positive for acute, occlusive deep venous thrombosis in the left common femoral, SFJ, profunda femoral, proximal/mid/distal femoral, and popliteal veins.  Left lower extremity duplex positive for acute, occlusive superficial thrombophlebitis in the thigh GSV. Right common femoral vein is thrombus free. 6/22/22 Chest CTA:  IMPRESSION  Bilateral pulmonary emboli, larger on the right. No evidence for peripheral infarction or right heart strain  This result was verbally relayed by me to Dr. Cortney Brown at 46 hours 1    Assessment/Recommendations:    72 yo female with PMHx of type II DM, RA admitted with unprovoked bilateral PE and LLE DVT. 1. Bilateral PE / LLE DVT:  Unprovoked and would benefit from anticoagulation indefinitely. Discussed pros/cons of testing for inherited thrombophilia. At her age, it is unlikely to yield any helpful information, poses additional cost, and results would not change her management. Discussed we could revisit this discussion as outpatient in a few months since testing is not advised in the acute setting of thrombosis (can show false positives). S/p thrombectomy in LLE on 6/23/22 by Dr. Abel Olea. -- Can transition to [address] as procedures are completed and no more invasive procedures planned. -- Recommend continuing on anticoagulation indefinitely  -- Outpatient follow up with Hematology in 2-3 months; already scheduled. 2. Type II DM:   On Ozempic at home. Hospital medicine managing ISS while inpatient. 3. Rheumatoid arthritis:  On Simponi injections under care of Dr. Delores Melo. I do not see any data concerning any thrombotic risk with this.      4. HTN:   Controlled on HTN    Signed By: Linsey Ag MD

## 2022-06-24 NOTE — PROGRESS NOTES
6/24/2022  Case Management Progress Note    11:58 AM  Patient is 71year old female admitted 6/22 with bilateral pulmonary embolism  Patient's RUR is 8% green/low risk for readmission  Covid test: none this admission  Chart reviewed--patient discussed at IDR rounds  Per rounds this morning patient is just waiting on an echo for discharge. Per initial assessment patient lives with her spouse and daughter and is independent at baseline. An Eliquis coupon was also already provided to patient. Will continue to follow and assist as needed, though patient is ok for discharge from CM standpoint when discharged medically. Transition of Care Plan   1. Continue medical management/treatment  2. Home with family assistance when ready   3. Family will transport at discharge  4. Follow up outpatient as indicated  5.  CM will continue to follow    MAHNAZ Holt

## 2022-06-24 NOTE — DISCHARGE INSTRUCTIONS
HOSPITALIST DISCHARGE INSTRUCTIONS  NAME: [facility]   :  1950   MRN:  [id number]     Date/Time:  2022 3:04 PM    ADMIT DATE: 2022     DISCHARGE DATE: 2022     ADMITTING DIAGNOSIS:  Pulmonary embolism   Deep vein thrombosis     DISCHARGE DIAGNOSIS:  As above     MEDICATIONS:     · It is important that you take the medication exactly as they are prescribed. · Keep your medication in the bottles provided by the pharmacist and keep a list of the medication names, dosages, and times to be taken in your wallet. · Do not take other medications without consulting your doctor. Pain Management: per above medications    What to do at Home    Recommended diet:  Regular Diet; AVOID NSAIDS (IBUPROFEN, BC POWDER, NAPROXEN), ALCOHOL     Recommended activity: Activity as tolerated    If you experience any of the following symptoms then please call your primary care physician or return to the emergency room if you cannot get hold of your doctor:  Fever, chills, nausea, vomiting, diarrhea, change in mentation, falling, bleeding, shortness of breath, chest pain    Follow Up:  Dr. Sheyla Lopez, Karen Prime, DO  you are to call and set up an appointment to see them in 2 weeks. Follow-up Information     Follow up With Specialties Details Why Contact Info    Dm Noble DO Family Medicine   [address] [address] [phone]            Information obtained by :  I understand that if any problems occur once I am at home I am to contact my physician. I understand and acknowledge receipt of the instructions indicated above.                                                                                                                                            Physician's or R.N.'s Signature                                                                  Date/Time Patient or Representative Signature                                                          Date/Time

## 2022-06-24 NOTE — PROGRESS NOTES
Bedside and Verbal shift change report given to Josh Thomas RN (oncoming nurse) by Brett Granados RN (offgoing nurse). Report included the following information SBAR, ED Summary, Intake/Output, MAR and Recent Results.

## 2022-06-24 NOTE — PROGRESS NOTES
Problem: Falls - Risk of  Goal: *Absence of Falls  Description: Document Chely Noble Fall Risk and appropriate interventions in the flowsheet.   Outcome: Progressing Towards Goal  Note: Fall Risk Interventions:  Mobility Interventions: Bed/chair exit alarm,Communicate number of staff needed for ambulation/transfer,Patient to call before getting OOB,Utilize walker, cane, or other assistive device         Medication Interventions: Bed/chair exit alarm,Evaluate medications/consider consulting pharmacy,Patient to call before getting OOB,Teach patient to arise slowly    Elimination Interventions: Bed/chair exit alarm,Call light in reach,Patient to call for help with toileting needs,Toileting schedule/hourly rounds              Problem: Pain  Goal: *Control of Pain  Outcome: Progressing Towards Goal     Problem: Patient Education: Go to Patient Education Activity  Goal: Patient/Family Education  Outcome: Progressing Towards Goal

## 2022-06-24 NOTE — PROGRESS NOTES
Attempted to schedule hospital follow up with PCP  Gulshan Farah.  Recording stated that office closes at 1:00pm on Fridays

## 2022-06-24 NOTE — PROGRESS NOTES
Doing well  No complications from procedure  Some expected residual swelling.    OK for DC on OACs  Follow up in 2 weeks for recheck and suture removal

## 2022-06-24 NOTE — PROGRESS NOTES
Discharge instructions reviewed with patient who verbalized understanding. IV removed. Eliquis discount card given.

## 2022-06-25 LAB — PROT C AG PPP IA-ACNC: 78 % (ref 60–150)

## 2022-06-25 NOTE — OP NOTES
Robinson Garrido [facility] 80  OPERATIVE REPORT    Name:  Kristan Jackson  MR#:  [id number]  :  1950  ACCOUNT #:  [de-identified]  DATE OF SERVICE:  2022    PREOPERATIVE DIAGNOSIS:  Left lower extremity deep vein thrombosis. POSTOPERATIVE DIAGNOSIS:  Left lower extremity deep vein thrombosis. PROCEDURES PERFORMED:  1. Percutaneous thrombectomy of left femoral and iliac veins. 2.  Intravascular ultrasound examination of left iliac vein. 3.  Intravenous ultrasound examination of the left femoral vein. 4.  Ultrasound-guided cannulation of left popliteal vein. SURGEON:  Mitzy Vieyra MD    ASSISTANT:  None. ANESTHESIA:  IV sedation and local anesthesia. COMPLICATIONS:  None. SPECIMENS REMOVED:  Clots. IMPLANTS:  None. ESTIMATED BLOOD LOSS:  Minimal.    INDICATION FOR PROCEDURE:  The patient is a 72-year-old female who has had a previous history of leg pain with extensive clot noted on ultrasound. Decision was made to take her to the cath lab for attempted debulking. PROCEDURE:  The patient was taken to the cath lab. Once a suitable level of anesthesia was introduced, she was prepped and draped in typical sterile fashion. Ultrasound examination of the left popliteal vein confirmed it to be patent. There was an access under direct ultrasound guidance and a permanent image stored. Catheter was introduced into the IVC and intravenous ultrasound catheter passed over the wire. Examination of the IVC, the left common and external iliac veins and left femoral vein was then undertaken. There was a significant amount of thrombus present within the femoral vein and a lesser amount of thrombus present within the iliac system. The inferior vena cava was free of thrombus. Next, the Inari device was used with multiple passes to perform a thrombectomy of the aforementioned veins. This removed a significant amount of thrombus.   Completion intravascular ultrasound showed a significant decrease in the amount of thrombus with some small amount of chronic remaining. The angiographic evaluation of the pelvic vessels confirmed patency. There was no evidence of May-Thurner syndrome. She tolerated the procedure well. There were no obvious complications. She was awakened and transferred to the recovery area in good condition.       MD NIRANJAN [email or web address]  D:  06/24/2022 15:38  T:  06/25/2022 1:53  JOB #:  [id number]

## 2022-06-29 LAB
AT III PPP CHRO-ACNC: 85 % (ref 75–135)
PROT S AG ACT/NOR PPP IA: 121 % (ref 60–150)
PROT S FREE AG ACT/NOR PPP IA: 97 % (ref 61–136)

## 2022-06-30 LAB
F5 P.R506Q BLD/T QL: NORMAL
HEXAGONAL PHASE PHOSPHOLIPID, 117839: 1 SEC (ref 0–11)
INTERPRETATION, 117893: ABNORMAL
PTT-LA INCUB MIX, 117036: 49 SEC (ref 0–48.9)
PTT-LA MIX, LUPR1T: 45.1 SEC (ref 0–48.9)
SCREEN APTT: 54.2 SEC (ref 0–51.9)
SCREEN DRVVT: 43.5 SEC (ref 0–47)

## 2022-07-01 LAB — F2 GENE MUT ANL BLD/T: NORMAL

## 2022-07-18 ENCOUNTER — TRANSCRIBE ORDER (OUTPATIENT)
Dept: SCHEDULING | Age: 72
End: 2022-07-18

## 2022-07-18 DIAGNOSIS — E78.5 HYPERLIPEMIA: ICD-10-CM

## 2022-07-18 DIAGNOSIS — I10 ESSENTIAL HYPERTENSION, MALIGNANT: Primary | ICD-10-CM

## 2022-07-18 DIAGNOSIS — R06.02 SHORTNESS OF BREATH: ICD-10-CM

## 2022-07-25 ENCOUNTER — TRANSCRIBE ORDER (OUTPATIENT)
Dept: SCHEDULING | Age: 72
End: 2022-07-25

## 2022-07-25 DIAGNOSIS — I10 ESSENTIAL HYPERTENSION, MALIGNANT: Primary | ICD-10-CM

## 2022-07-25 DIAGNOSIS — E78.5 HYPERLIPEMIA: ICD-10-CM

## 2022-07-25 DIAGNOSIS — R06.02 SHORTNESS OF BREATH: ICD-10-CM

## 2022-07-27 ENCOUNTER — TRANSCRIBE ORDER (OUTPATIENT)
Dept: SCHEDULING | Age: 72
End: 2022-07-27

## 2022-07-27 DIAGNOSIS — E78.5 HYPERLIPEMIA: ICD-10-CM

## 2022-07-27 DIAGNOSIS — I26.92 SADDLE EMBOLUS OF PULMONARY ARTERY (HCC): Primary | ICD-10-CM

## 2022-07-27 DIAGNOSIS — R06.02 SHORTNESS OF BREATH: Primary | ICD-10-CM

## 2022-07-27 DIAGNOSIS — R07.9 CHEST PAIN, UNSPECIFIED: ICD-10-CM

## 2022-07-27 DIAGNOSIS — I50.32 CHRONIC DIASTOLIC HEART FAILURE (HCC): ICD-10-CM

## 2022-07-27 DIAGNOSIS — I10 ESSENTIAL HYPERTENSION, MALIGNANT: ICD-10-CM

## 2022-07-28 ENCOUNTER — TRANSCRIBE ORDER (OUTPATIENT)
Dept: SCHEDULING | Age: 72
End: 2022-07-28

## 2022-09-19 NOTE — PROGRESS NOTES
[facility] [place] at [address], [address] [facility] [address] [facility]  Anirudh [place]: [phone]  F: [phone]      Reason for Visit:   Babs Hebert is a 71 y.o. female who is seen for follow up of DVT, PE. History of Present Illness:   Babs Hebert is a pleasant 71 y.o. female is seen for follow up of DVT, PE. She was hospitalized 6/22/22 with SOB at rest and exertion and diagnosed with bilateral PE as well as LLE DVT. Vascular surgery evaluated and given extensive LLE DVT, pt  underwent thrombectomy. She was also treated with Heparin infusion and transitioned to DOAC upon discharge. She had received a spinal injection with Dr. Althea Clemens in May 2022. She does have RA and was switched to Simponi injections in 4/2022 under the care of Dr. Isabela Melissa. She is on Ozempic for Type II DM. She denies any long 8hr trips, but states she did drive to [facility] in March 2022. No recent surgeries, fractures, use of hormone replacement. She denies any smoking or EtOH use. Pt has no prior h/o VTE. Reports that her daughter has SLE and developed RUE DVT and R leg swelling after a trip to [place]. Interval History:  Pt comes in for follow up of DVT, PE. She remains on Eliquis, tolerating well without melena/hematochezia. Left leg swelling has improved. Prior CP, SOB resolved. Is not using her CPAP regularly. States she will see Pulmonology soon. PMHx: Chronic pain, DM, Fibromyalgia, RA, GERD  Meds/Allergies: Reviewed  Review of Systems: A complete review of systems was obtained, reviewed. Pertinent findings reviewed above.     Physical Exam:     Visit Vitals  /76   Pulse 72   Temp 98.1 °F (36.7 °C) (Oral)   Resp 18   Ht 5' 5\" (1.651 m)   Wt 260 lb 9.6 oz (118.2 kg)   SpO2 97%   BMI 43.37 kg/m²       ECOG PS: 1  General: no distress  Eyes: anicteric sclerae  HENT: oropharynx clear  Neck: supple  Lymphatic: no cervical, supraclavicular adenopathy  Respiratory: normal respiratory effort  CV: no peripheral edema  GI: soft, nontender, nondistended, no masses  Skin: no rashes; no ecchymoses; no petechiae      Results:     Lab Results   Component Value Date/Time    WBC 5.4 06/24/2022 09:15 AM    HGB 10.1 (L) 06/24/2022 09:15 AM    HCT 30.3 (L) 06/24/2022 09:15 AM    PLATELET 254 99/59/6779 09:15 AM    MCV 90.7 06/24/2022 09:15 AM    ABS. NEUTROPHILS 2.3 06/24/2022 09:15 AM     Lab Results   Component Value Date/Time    Sodium 139 06/24/2022 09:15 AM    Potassium 3.3 (L) 06/24/2022 09:15 AM    Chloride 103 06/24/2022 09:15 AM    CO2 28 06/24/2022 09:15 AM    Glucose 122 (H) 06/24/2022 09:15 AM    BUN 12 06/24/2022 09:15 AM    Creatinine 0.81 06/24/2022 09:15 AM    GFR est AA >60 06/24/2022 09:15 AM    GFR est non-AA >60 06/24/2022 09:15 AM    Calcium 8.4 (L) 06/24/2022 09:15 AM    Glucose (POC) 114 06/24/2022 12:15 PM     Lab Results   Component Value Date/Time    Bilirubin, total 1.0 06/22/2022 12:34 PM    ALT (SGPT) 26 06/22/2022 12:34 PM    Alk.  phosphatase 77 06/22/2022 12:34 PM    Protein, total 7.5 06/22/2022 12:34 PM    Albumin 3.0 (L) 06/22/2022 12:34 PM    Globulin 4.5 (H) 06/22/2022 12:34 PM     No results found for: RETIC, PSAT, FE, TIBC, FERR, B12LT, FOL, RBCF2L, [id number], COSLT, HOMCY, MMALT, HAPT, HAPGB, PCTDBS, ERYTLT, LDH, B2MSLT, SR, ESRA, CRP, 299611, 271840, ERYTLT, TSH, SPE6L, [place], 916988, LPSE, HCGAI, HCGAT, [id number], HHIVI, RHIV12, [id number], H12L2, TSHEXT, TSHEXT    Lab Results   Component Value Date/Time    aPTT 46.9 (H) 06/24/2022 03:59 AM    D-dimer 0.50 09/10/2020 11:32 PM    Antithrombin Activity 85 06/24/2022 09:15 AM    Protein S, Total 121 06/24/2022 09:15 AM    Protein S, Free 97 06/24/2022 09:15 AM    Protein C Antigen 78 06/24/2022 09:15 AM     No results found for: CEA, 013732, C199LT, C125, LQUY035, 2729LT, C153LT, PSA, LDH, [id number], HCGTLT, HCGN, AFP, CHRGLT, CHRGA     6/24/22 Factor VIII: 236    6/22/2022 Duplex LE venous  Left lower extremity venous duplex positive for acute, occlusive deep venous thrombosis in the left common femoral, SFJ, profunda femoral, proximal/mid/distal femoral, and popliteal veins. Left lower extremity duplex positive for acute, occlusive superficial thrombophlebitis in the thigh GSV. Right common femoral vein is thrombus free. 6/22/22 Chest CTA:  IMPRESSION  Bilateral pulmonary emboli, larger on the right. No evidence for peripheral infarction or right heart strain  This result was verbally relayed by me to  [facility] at 46 hours 1    Assessment/Recommendations:   71 y.o. female with PMHx of type II DM, RA admitted with unprovoked bilateral PE and LLE DVT. 1. Bilateral PE / LLE DVT:  Unprovoked and would benefit from anticoagulation indefinitely. Discussed pros/cons of testing for inherited thrombophilia. At her age, it is unlikely to yield any helpful information, poses additional cost, and results would not change her management. Nonetheless, these labs were checked by hospital team and results are negative aside from mildly elevated FVIII level. Elevated FVIII level is considered an independent risk factor for thrombosis, which does not fluctuate over time. I have already advised continuing on anticoagulation indefinitely. S/p thrombectomy in LLE on 6/23/22 by Dr. Aline Snow. -- Continue Eliquis 5mg bid. Recommend continuing on anticoagulation indefinitely. PCP is refilling.   -- Follow up as needed and prior to any invasive procedures/surgeries. 2. Type II DM:   On Ozempic at home. 3. Rheumatoid arthritis:  On Simponi injections under care of Dr. Benigno Cedeno. I do not see any data concerning any thrombotic risk with this. 4. HTN:   Controlled on current antihypertensive regimen.      Signed By: Kriss Bentley MD

## 2022-09-20 ENCOUNTER — OFFICE VISIT (OUTPATIENT)
Dept: ONCOLOGY | Age: 72
End: 2022-09-20
Payer: MEDICARE

## 2022-09-20 VITALS
TEMPERATURE: 98.1 F | SYSTOLIC BLOOD PRESSURE: 125 MMHG | WEIGHT: 260.6 LBS | OXYGEN SATURATION: 97 % | HEIGHT: 65 IN | DIASTOLIC BLOOD PRESSURE: 76 MMHG | RESPIRATION RATE: 18 BRPM | BODY MASS INDEX: 43.42 KG/M2 | HEART RATE: 72 BPM

## 2022-09-20 DIAGNOSIS — I10 PRIMARY HYPERTENSION: ICD-10-CM

## 2022-09-20 DIAGNOSIS — M06.9 RHEUMATOID ARTHRITIS INVOLVING MULTIPLE SITES, UNSPECIFIED WHETHER RHEUMATOID FACTOR PRESENT (HCC): ICD-10-CM

## 2022-09-20 DIAGNOSIS — Z86.718 HISTORY OF DVT (DEEP VEIN THROMBOSIS): ICD-10-CM

## 2022-09-20 DIAGNOSIS — I26.99 BILATERAL PULMONARY EMBOLISM (HCC): Primary | ICD-10-CM

## 2022-09-20 PROCEDURE — 1090F PRES/ABSN URINE INCON ASSESS: CPT | Performed by: INTERNAL MEDICINE

## 2022-09-20 PROCEDURE — G8400 PT W/DXA NO RESULTS DOC: HCPCS | Performed by: INTERNAL MEDICINE

## 2022-09-20 PROCEDURE — G8432 DEP SCR NOT DOC, RNG: HCPCS | Performed by: INTERNAL MEDICINE

## 2022-09-20 PROCEDURE — G8427 DOCREV CUR MEDS BY ELIG CLIN: HCPCS | Performed by: INTERNAL MEDICINE

## 2022-09-20 PROCEDURE — 1123F ACP DISCUSS/DSCN MKR DOCD: CPT | Performed by: INTERNAL MEDICINE

## 2022-09-20 PROCEDURE — 1101F PT FALLS ASSESS-DOCD LE1/YR: CPT | Performed by: INTERNAL MEDICINE

## 2022-09-20 PROCEDURE — 3017F COLORECTAL CA SCREEN DOC REV: CPT | Performed by: INTERNAL MEDICINE

## 2022-09-20 PROCEDURE — G0463 HOSPITAL OUTPT CLINIC VISIT: HCPCS | Performed by: INTERNAL MEDICINE

## 2022-09-20 PROCEDURE — G8536 NO DOC ELDER MAL SCRN: HCPCS | Performed by: INTERNAL MEDICINE

## 2022-09-20 PROCEDURE — G8417 CALC BMI ABV UP PARAM F/U: HCPCS | Performed by: INTERNAL MEDICINE

## 2022-09-20 PROCEDURE — 99214 OFFICE O/P EST MOD 30 MIN: CPT | Performed by: INTERNAL MEDICINE

## 2022-09-20 NOTE — LETTER
9/20/2022    Patient: Lilia Paz   YOB: 1950   Date of Visit: 9/20/2022     Boo Ribeiro, [address] [place] [id number]  Via Fax: [phone]    Dear Boo Ribeiro DO,      [facility] you [facility]. My notes for this consultation are attached. If you have questions, please do not hesitate to call me. I look forward to following your patient along with you.       Sincerely,    Naif Cervantes MD

## 2022-11-09 ENCOUNTER — HOSPITAL ENCOUNTER (OUTPATIENT)
Dept: CT IMAGING | Age: 72
Discharge: HOME | End: 2022-11-09
Payer: MEDICARE

## 2022-11-09 VITALS — SYSTOLIC BLOOD PRESSURE: 118 MMHG | DIASTOLIC BLOOD PRESSURE: 67 MMHG | HEART RATE: 62 BPM

## 2022-11-09 DIAGNOSIS — I50.32 CHRONIC DIASTOLIC HEART FAILURE (HCC): ICD-10-CM

## 2022-11-09 DIAGNOSIS — I26.92 SADDLE EMBOLUS OF PULMONARY ARTERY (HCC): ICD-10-CM

## 2022-11-09 LAB — CREAT BLD-MCNC: 0.8 MG/DL (ref 0.6–1.3)

## 2022-11-09 PROCEDURE — 74011250637 HC RX REV CODE- 250/637: Performed by: INTERNAL MEDICINE

## 2022-11-09 PROCEDURE — 75574 CT ANGIO HRT W/3D IMAGE: CPT

## 2022-11-09 PROCEDURE — 74011000636 HC RX REV CODE- 636

## 2022-11-09 PROCEDURE — 82565 ASSAY OF CREATININE: CPT

## 2022-11-09 RX ORDER — NITROGLYCERIN 0.4 MG/1
0.8 TABLET SUBLINGUAL AS NEEDED
Status: DISCONTINUED | OUTPATIENT
Start: 2022-11-09 | End: 2022-11-09

## 2022-11-09 RX ADMIN — NITROGLYCERIN 0.8 MG: 0.4 TABLET, ORALLY DISINTEGRATING SUBLINGUAL at 09:04

## 2022-11-09 RX ADMIN — IOPAMIDOL 100 ML: 755 INJECTION, SOLUTION INTRAVENOUS at 08:53

## 2022-11-30 ENCOUNTER — APPOINTMENT (OUTPATIENT)
Dept: VASCULAR SURGERY | Age: 72
End: 2022-11-30
Attending: EMERGENCY MEDICINE
Payer: MEDICARE

## 2022-11-30 ENCOUNTER — HOSPITAL ENCOUNTER (EMERGENCY)
Age: 72
Discharge: HOME | End: 2022-11-30
Attending: EMERGENCY MEDICINE
Payer: MEDICARE

## 2022-11-30 ENCOUNTER — APPOINTMENT (OUTPATIENT)
Dept: GENERAL RADIOLOGY | Age: 72
End: 2022-11-30
Attending: EMERGENCY MEDICINE
Payer: MEDICARE

## 2022-11-30 VITALS
WEIGHT: 248.2 LBS | HEART RATE: 73 BPM | TEMPERATURE: 98.4 F | BODY MASS INDEX: 41.35 KG/M2 | OXYGEN SATURATION: 100 % | RESPIRATION RATE: 16 BRPM | SYSTOLIC BLOOD PRESSURE: 148 MMHG | HEIGHT: 65 IN | DIASTOLIC BLOOD PRESSURE: 85 MMHG

## 2022-11-30 DIAGNOSIS — M79.89 LEG SWELLING: Primary | ICD-10-CM

## 2022-11-30 DIAGNOSIS — M71.22 SYNOVIAL CYST OF LEFT POPLITEAL SPACE: ICD-10-CM

## 2022-11-30 LAB
ANION GAP SERPL CALC-SCNC: 4 MMOL/L (ref 5–15)
BASOPHILS # BLD: 0.1 K/UL (ref 0–0.1)
BASOPHILS NFR BLD: 2 % (ref 0–1)
BUN SERPL-MCNC: 20 MG/DL (ref 6–20)
BUN/CREAT SERPL: 20 (ref 12–20)
CALCIUM SERPL-MCNC: 9.2 MG/DL (ref 8.5–10.1)
CHLORIDE SERPL-SCNC: 106 MMOL/L (ref 97–108)
CO2 SERPL-SCNC: 30 MMOL/L (ref 21–32)
COMMENT, HOLDF: NORMAL
CREAT SERPL-MCNC: 0.98 MG/DL (ref 0.55–1.02)
D DIMER PPP FEU-MCNC: 0.94 MG/L FEU (ref 0–0.65)
DIFFERENTIAL METHOD BLD: ABNORMAL
EOSINOPHIL # BLD: 0.2 K/UL (ref 0–0.4)
EOSINOPHIL NFR BLD: 4 % (ref 0–7)
ERYTHROCYTE [DISTWIDTH] IN BLOOD BY AUTOMATED COUNT: 17 % (ref 11.5–14.5)
GLUCOSE SERPL-MCNC: 98 MG/DL (ref 65–100)
HCT VFR BLD AUTO: 38.5 % (ref 35–47)
HGB BLD-MCNC: 12.4 G/DL (ref 11.5–16)
IMM GRANULOCYTES # BLD AUTO: 0 K/UL (ref 0–0.04)
IMM GRANULOCYTES NFR BLD AUTO: 0 % (ref 0–0.5)
LYMPHOCYTES # BLD: 1.1 K/UL (ref 0.8–3.5)
LYMPHOCYTES NFR BLD: 31 % (ref 12–49)
MCH RBC QN AUTO: 28.2 PG (ref 26–34)
MCHC RBC AUTO-ENTMCNC: 32.2 G/DL (ref 30–36.5)
MCV RBC AUTO: 87.5 FL (ref 80–99)
MONOCYTES # BLD: 0.3 K/UL (ref 0–1)
MONOCYTES NFR BLD: 8 % (ref 5–13)
NEUTS SEG # BLD: 2 K/UL (ref 1.8–8)
NEUTS SEG NFR BLD: 55 % (ref 32–75)
NRBC # BLD: 0 K/UL (ref 0–0.01)
NRBC BLD-RTO: 0 PER 100 WBC
PLATELET # BLD AUTO: 254 K/UL (ref 150–400)
PMV BLD AUTO: 10.5 FL (ref 8.9–12.9)
POTASSIUM SERPL-SCNC: 3.4 MMOL/L (ref 3.5–5.1)
RBC # BLD AUTO: 4.4 M/UL (ref 3.8–5.2)
SAMPLES BEING HELD,HOLD: NORMAL
SODIUM SERPL-SCNC: 140 MMOL/L (ref 136–145)
WBC # BLD AUTO: 3.7 K/UL (ref 3.6–11)

## 2022-11-30 PROCEDURE — 93971 EXTREMITY STUDY: CPT

## 2022-11-30 PROCEDURE — 99284 EMERGENCY DEPT VISIT MOD MDM: CPT

## 2022-11-30 PROCEDURE — 80048 BASIC METABOLIC PNL TOTAL CA: CPT

## 2022-11-30 PROCEDURE — 85025 COMPLETE CBC W/AUTO DIFF WBC: CPT

## 2022-11-30 PROCEDURE — 36415 COLL VENOUS BLD VENIPUNCTURE: CPT

## 2022-11-30 PROCEDURE — 73562 X-RAY EXAM OF KNEE 3: CPT

## 2022-11-30 PROCEDURE — 85379 FIBRIN DEGRADATION QUANT: CPT

## 2022-11-30 NOTE — ED TRIAGE NOTES
Pt arrives to the ER for complaints of left leg pain and swelling that started a few days ago. Pt reports slight cough. Pt reports that in June she was diagnosed with a DVT and saddle PE and has been taking eliquis twice a day since then. Denies chest pain or shortness of breath. PMH of arthritis.

## 2022-11-30 NOTE — Clinical Note
[address]  [facility] EMERGENCY DEPT  [address] [id number]  [phone]    Work/School Note    Date: 11/30/2022    To Whom It May concern:    Opal Ware was seen and treated today in the emergency room by the following provider(s):  Attending Provider: Alexander Giraldo MD.      Opal Ware is excused from work/school on 11/30/2022 through 12/2/2022. She is medically clear to return to work/school on 12/3/2022.          Sincerely,          Frances Thayer MD

## 2022-11-30 NOTE — ED PROVIDER NOTES
Zahraa Lamb is a 72 yo F with h/o DVT and PE on Eliquis who has had swelling in her left leg for 2 days. She denies any recent trauma but does note that she \"tweaked\" her knee 2 months ago and thinks it may be related to that. She is followed by orthopedics and has been working to loose weight so that she would qualify for a knee replacement. She states she had been doing well on her eliquis and has not missed any doses. She denies chest pain or shortness of breath.            Past Medical History:   Diagnosis Date    Chronic pain     fibromyalgia chronic bilateral knee pain    Diabetes (HCC)     Fibromyalgia     GERD (gastroesophageal reflux disease)     Morbid obesity ([address]        Past Surgical History:   Procedure Laterality Date    COLONOSCOPY N/A 12/14/2016    COLONOSCOPY / EGD  performed by Alley Stevenson MD at [id number] [place] [address] Drive,[address] HYSTERECTOMY      HX ORTHOPAEDIC  2005    left bunionectomy    HX SHOULDER REPLACEMENT Bilateral     right 2013 left 2015         Family History:   Problem Relation Age of Onset    Huntingtons disease Mother     Diabetes Brother     Arthritis-rheumatoid Maternal Grandmother        Social History     Socioeconomic History    Marital status:      Spouse name: Not on file    Number of children: Not on file    Years of education: Not on file    Highest education level: Not on file   Occupational History    Not on file   Tobacco Use    Smoking status: Former    Smokeless tobacco: Never   Substance and Sexual Activity    Alcohol use: No    Drug use: No    Sexual activity: Not on file   Other Topics Concern     Service Not Asked    Blood Transfusions Not Asked    Caffeine Concern Not Asked    Occupational Exposure Not Asked    Hobby Hazards Not Asked    Sleep Concern Not Asked    Stress Concern Not Asked    Weight Concern Not Asked    Special Diet Not Asked    Back Care Not Asked    Exercise Not Asked    Bike Helmet Not Asked    Seat Belt Not Asked    Self-Exams Not Asked   Social History Narrative    Not on file     Social Determinants of Health     Financial Resource Strain: Not on file   Food Insecurity: Not on file   Transportation Needs: Not on file   Physical Activity: Not on file   Stress: Not on file   Social Connections: Not on file   Intimate Partner Violence: Not on file   Housing Stability: Not on file         ALLERGIES: Oxycodone, Aspirin, Lamisil [terbinafine], Sulfa (sulfonamide antibiotics), and Sulfabenzamide    Review of Systems   Constitutional:  Negative for fever. HENT:  Negative for sore throat. Eyes:  Negative for visual disturbance. Respiratory:  Negative for cough. Cardiovascular:  Positive for leg swelling (left). Negative for chest pain. Gastrointestinal:  Negative for abdominal pain. Genitourinary:  Negative for dysuria. Musculoskeletal:  Negative for back pain. Skin:  Negative for rash. Neurological:  Negative for headaches. Vitals:    11/30/22 1615 11/30/22 1616   BP: (!) 148/85    Pulse: 73    Resp: 16    Temp: 98.4 °F (36.9 °C)    SpO2: 100%    Weight:  112.6 kg (248 lb 3.2 oz)   Height: 5' 5\" (1.651 m)             Physical Exam  Vitals and nursing note reviewed. Constitutional:       General: She is not in acute distress. Appearance: She is well-developed. HENT:      Head: Normocephalic and atraumatic. Mouth/Throat:      Mouth: Mucous membranes are moist.   Eyes:      Extraocular Movements: Extraocular movements intact. Conjunctiva/sclera: Conjunctivae normal.   Neck:      Trachea: Phonation normal.   Cardiovascular:      Rate and Rhythm: Normal rate. Pulmonary:      Effort: Pulmonary effort is normal. No respiratory distress. Abdominal:      General: There is no distension. Musculoskeletal:         General: No tenderness. Normal range of motion. Cervical back: Normal range of motion. Left lower leg: Swelling present. No deformity or tenderness.    Skin:     General: Skin is warm and dry.   Neurological:      General: No focal deficit present. Mental Status: She is alert. She is not disoriented. Motor: No abnormal muscle tone. MDM       5:36 PM  US negative for acute DVT. Does show recanalized chronic thrombus in left popliteal and peroneal veins, as well as baker's cyst.  Will discharge home.      Procedures

## 2023-03-06 ENCOUNTER — HOSPITAL ENCOUNTER (EMERGENCY)
Age: 73
Discharge: HOME | End: 2023-03-06
Attending: EMERGENCY MEDICINE
Payer: MEDICARE

## 2023-03-06 ENCOUNTER — HOSPITAL ENCOUNTER (EMERGENCY)
Dept: GENERAL RADIOLOGY | Age: 73
Discharge: HOME | End: 2023-03-06
Attending: EMERGENCY MEDICINE
Payer: MEDICARE

## 2023-03-06 VITALS
HEIGHT: 65 IN | OXYGEN SATURATION: 98 % | BODY MASS INDEX: 40.82 KG/M2 | DIASTOLIC BLOOD PRESSURE: 62 MMHG | HEART RATE: 79 BPM | SYSTOLIC BLOOD PRESSURE: 114 MMHG | RESPIRATION RATE: 18 BRPM | WEIGHT: 245 LBS | TEMPERATURE: 98.2 F

## 2023-03-06 DIAGNOSIS — M54.41 ACUTE BACK PAIN WITH SCIATICA, RIGHT: Primary | ICD-10-CM

## 2023-03-06 PROCEDURE — 96372 THER/PROPH/DIAG INJ SC/IM: CPT

## 2023-03-06 PROCEDURE — 74011636637 HC RX REV CODE- 636/637: Performed by: EMERGENCY MEDICINE

## 2023-03-06 PROCEDURE — 73502 X-RAY EXAM HIP UNI 2-3 VIEWS: CPT

## 2023-03-06 PROCEDURE — A9270 NON-COVERED ITEM OR SERVICE: HCPCS | Performed by: EMERGENCY MEDICINE

## 2023-03-06 PROCEDURE — 74011250636 HC RX REV CODE- 250/636: Performed by: EMERGENCY MEDICINE

## 2023-03-06 PROCEDURE — 73560 X-RAY EXAM OF KNEE 1 OR 2: CPT

## 2023-03-06 PROCEDURE — 99284 EMERGENCY DEPT VISIT MOD MDM: CPT

## 2023-03-06 PROCEDURE — 74011250637 HC RX REV CODE- 250/637: Performed by: EMERGENCY MEDICINE

## 2023-03-06 RX ORDER — PREDNISONE 10 MG/1
TABLET ORAL
Qty: 21 TABLET | Refills: 0 | Status: SHIPPED | OUTPATIENT
Start: 2023-03-06

## 2023-03-06 RX ORDER — PREDNISONE 20 MG/1
60 TABLET ORAL
Status: COMPLETED | OUTPATIENT
Start: 2023-03-06 | End: 2023-03-06

## 2023-03-06 RX ORDER — CYCLOBENZAPRINE HCL 10 MG
10 TABLET ORAL
Qty: 15 TABLET | Refills: 0 | Status: SHIPPED | OUTPATIENT
Start: 2023-03-06 | End: 2023-03-11

## 2023-03-06 RX ORDER — KETOROLAC TROMETHAMINE 30 MG/ML
30 INJECTION, SOLUTION INTRAMUSCULAR; INTRAVENOUS
Status: COMPLETED | OUTPATIENT
Start: 2023-03-06 | End: 2023-03-06

## 2023-03-06 RX ORDER — CYCLOBENZAPRINE HCL 10 MG
10 TABLET ORAL
Status: COMPLETED | OUTPATIENT
Start: 2023-03-06 | End: 2023-03-06

## 2023-03-06 RX ORDER — TRAMADOL HYDROCHLORIDE 50 MG/1
50 TABLET ORAL
Qty: 12 TABLET | Refills: 0 | Status: SHIPPED | OUTPATIENT
Start: 2023-03-06 | End: 2023-03-09

## 2023-03-06 RX ADMIN — CYCLOBENZAPRINE 10 MG: 10 TABLET, FILM COATED ORAL at 16:17

## 2023-03-06 RX ADMIN — KETOROLAC TROMETHAMINE 30 MG: 30 INJECTION, SOLUTION INTRAMUSCULAR; INTRAVENOUS at 16:16

## 2023-03-06 RX ADMIN — PREDNISONE 60 MG: 20 TABLET ORAL at 16:17

## 2023-03-06 NOTE — ED NOTES
I have reviewed discharge instructions with the patient. The patient verbalized understanding. No further questions at this time. Patient discharged via wheelchair in stable condition.

## 2023-03-06 NOTE — ED TRIAGE NOTES
Pt arrives with chronic pain to R side  States yesterday she felt her muscles in her leg tighten up and ever since then she has been in excruciating pain

## 2023-03-06 NOTE — ED PROVIDER NOTES
Pain in R leg starting yesterday. R hip always hurts but yesterday morning felt sudden onset of sharp pain in R leg after sitting down in [employer] [place] like her muscles went stiff. Didn't stand up until end of service. Walked down ramp and into car. By then pain was excruciating from R hip down to R foot. Pain worsened with movement or walking. Some relief when she leans onto to her L side and take pressure off R hip. Took tramadol yesterday with no improvement. PMH sig for DVT in L calf. Has had nerve block in L leg for pain in past.  Pt on Eliquis for DVT. Hasn't noticed any swelling.          Past Medical History:   Diagnosis Date    Chronic pain     fibromyalgia chronic bilateral knee pain    Diabetes (HCC)     Fibromyalgia     GERD (gastroesophageal reflux disease)     Morbid obesity ([facility]        Past Surgical History:   Procedure Laterality Date    COLONOSCOPY N/A 12/14/2016    COLONOSCOPY / EGD  performed by Doris Fry MD at [address] [facility] [address] HYSTERECTOMY      HX ORTHOPAEDIC  2005    left bunionectomy    HX SHOULDER REPLACEMENT Bilateral     right 2013 left 2015         Family History:   Problem Relation Age of Onset    Huntingtons disease Mother     Diabetes Brother     Arthritis-rheumatoid Maternal Grandmother        Social History     Socioeconomic History    Marital status:      Spouse name: Not on file    Number of children: Not on file    Years of education: Not on file    Highest education level: Not on file   Occupational History    Not on file   Tobacco Use    Smoking status: Former    Smokeless tobacco: Never   Substance and Sexual Activity    Alcohol use: No    Drug use: No    Sexual activity: Not on file   Other Topics Concern     Service Not Asked    Blood Transfusions Not Asked    Caffeine Concern Not Asked    Occupational Exposure Not Asked    Hobby Hazards Not Asked    Sleep Concern Not Asked    Stress Concern Not Asked    Weight Concern Not Asked    Special Diet Not Asked    Back Care Not Asked    Exercise Not Asked    Bike Helmet Not Asked    Seat Belt Not Asked    Self-Exams Not Asked   Social History Narrative    Not on file     Social Determinants of Health     Financial Resource Strain: Not on file   Food Insecurity: Not on file   Transportation Needs: Not on file   Physical Activity: Not on file   Stress: Not on file   Social Connections: Not on file   Intimate Partner Violence: Not on file   Housing Stability: Not on file         ALLERGIES: Oxycodone, Aspirin, Lamisil [terbinafine], Sulfa (sulfonamide antibiotics), and Sulfabenzamide    Review of Systems   Constitutional:  Negative for fever. HENT:  Negative for facial swelling. Eyes:  Negative for visual disturbance. Respiratory:  Negative for chest tightness. Cardiovascular:  Negative for chest pain. Gastrointestinal:  Negative for abdominal pain. Genitourinary:  Negative for difficulty urinating and dysuria. Musculoskeletal:  Negative for arthralgias. Skin:  Negative for rash. Neurological:  Negative for headaches. Hematological:  Negative for adenopathy. Psychiatric/Behavioral:  Negative for suicidal ideas. Vitals:    03/06/23 1550   BP: 114/62   Pulse: 79   Resp: 18   Temp: 98.2 °F (36.8 °C)   SpO2: 98%   Weight: 111.1 kg (245 lb)   Height: 5' 5\" (1.651 m)            Physical Exam  Vitals and nursing note reviewed. Constitutional:       General: She is not in acute distress. Appearance: She is well-developed. HENT:      Head: Normocephalic and atraumatic. Eyes:      General: No scleral icterus. Conjunctiva/sclera: Conjunctivae normal.      Pupils: Pupils are equal, round, and reactive to light. Cardiovascular:      Rate and Rhythm: Normal rate. Heart sounds: No murmur heard. Pulmonary:      Effort: Pulmonary effort is normal. No respiratory distress. Abdominal:      General: There is no distension.    Musculoskeletal:         General: Normal range of motion. Cervical back: Normal range of motion and neck supple. Skin:     General: Skin is warm and dry. Findings: No rash. Neurological:      Mental Status: She is alert and oriented to person, place, and time. Medical Decision Making  Assessment: Patient here with low back pain radiating down to the right hip and the right leg down to her foot. Exam is unremarkable with no evidence of any swelling. I do not have any concern for DVT or blood clot in the leg. Her symptoms are most consistent with sciatica. X-rays of the right knee and the right hip are unremarkable except for degenerative changes. Patient is currently resting comfortably. I think she can be discharged home to treat as sciatica. I will prescriptions for prednisone, tramadol, Flexeril. She can follow-up with her primary care doctor as needed. Patient return to the ER for any worsening symptoms. Amount and/or Complexity of Data Reviewed  Radiology: ordered. Risk  Prescription drug management.            Procedures

## 2023-05-02 ENCOUNTER — TELEPHONE (OUTPATIENT)
Dept: ONCOLOGY | Age: 73
End: 2023-05-02

## 2023-05-03 ENCOUNTER — TELEPHONE (OUTPATIENT)
Dept: ONCOLOGY | Age: 73
End: 2023-05-03

## 2023-05-21 RX ORDER — DULOXETIN HYDROCHLORIDE 60 MG/1
60 CAPSULE, DELAYED RELEASE ORAL
COMMUNITY
Start: 2016-08-01

## 2023-05-21 RX ORDER — TROSPIUM CHLORIDE 20 MG/1
20 TABLET, FILM COATED ORAL
COMMUNITY

## 2023-05-21 RX ORDER — GABAPENTIN 400 MG/1
400 CAPSULE ORAL
COMMUNITY

## 2023-05-21 RX ORDER — HYDROCHLOROTHIAZIDE 25 MG/1
TABLET ORAL
COMMUNITY
Start: 2016-08-01

## 2023-05-21 RX ORDER — PREDNISONE 10 MG/1
TABLET ORAL
COMMUNITY
Start: 2023-03-06

## 2023-05-21 RX ORDER — PANTOPRAZOLE SODIUM 40 MG/1
40 TABLET, DELAYED RELEASE ORAL
COMMUNITY

## 2023-05-21 RX ORDER — CYANOCOBALAMIN/FOLIC AC/VIT B6 1-2.5-25MG
1 TABLET ORAL DAILY
COMMUNITY

## 2023-05-21 RX ORDER — SEMAGLUTIDE 1.34 MG/ML
0.25 INJECTION, SOLUTION SUBCUTANEOUS
COMMUNITY

## 2023-05-21 RX ORDER — SENNOSIDES 8.6 MG
650 CAPSULE ORAL
COMMUNITY

## 2023-06-22 ENCOUNTER — TELEPHONE (OUTPATIENT)
Age: 73
End: 2023-06-22

## 2023-06-22 NOTE — TELEPHONE ENCOUNTER
Pt called stating she's having knee surgery at [address] [place] [address] on 7/19. Dr. Agustin Doan is needing clearance to proceed with surgery. Pt was checking to see if we received the fax that was sent over. Request a call back.      CB# [phone]

## 2023-06-23 NOTE — TELEPHONE ENCOUNTER
[address] at [facility]  [phone]    06/23/23 12:43 PM - Called and spoke with the patient. Patient's ID verified x 2. Advised we received the form and are faxing it back to [place] now. Advised, per NP, she will need to hold her Eliquis for 2-3 days prior to surgery. Advised she can resume the Eliquis the night of or the next morning if adequate hemostasis is achieved per the surgeon. The patient voiced understanding and gratitude for the call. No further questions or concerns.

## 2023-11-14 ENCOUNTER — HOSPITAL ENCOUNTER (OUTPATIENT)
Facility: HOSPITAL | Age: 73
Discharge: HOME | End: 2023-11-17
Payer: MEDICARE

## 2023-11-14 VITALS — BODY MASS INDEX: 37.49 KG/M2 | HEIGHT: 65 IN | WEIGHT: 225 LBS

## 2023-11-14 DIAGNOSIS — Z12.31 ENCOUNTER FOR SCREENING MAMMOGRAM FOR MALIGNANT NEOPLASM OF BREAST: ICD-10-CM

## 2023-11-14 PROCEDURE — 77063 BREAST TOMOSYNTHESIS BI: CPT

## (undated) DEVICE — CLOTTRIEVER SHEATH, 13 FR, 15 CM: Brand: CLOTTRIEVER SHEATH, 13 FR

## (undated) DEVICE — RADIFOCUS GLIDEWIRE: Brand: GLIDEWIRE

## (undated) DEVICE — DRESSING HEMOSTATIC SFT INTVENT W/O SLT DBL WRP QUIKCLOT LF

## (undated) DEVICE — PINNACLE INTRODUCER SHEATH: Brand: PINNACLE

## (undated) DEVICE — SUTURE PROL SZ 2-0 L18IN NONABSORBABLE BLU FS L26MM 3/8 CIR 8685H

## (undated) DEVICE — CATHETER ANGIO 5FR L100CM GWIRE 0.038IN BERN NONBRAIDED HI

## (undated) DEVICE — CATHETER ANGIO 5FR L65CM 0.038IN BERN SEL SFT RADPQ TIP HI

## (undated) DEVICE — 4F (1.0 MM ID) X 9CM4F (1.0 MM I REGULAR MICRO-STICK® INTRODUCER SETINTRODUCER SET WITH NITINOL GUIDEWIREWITH NITIN WITH RADIOPAQUE TIPWITH RADIOPAQ: Brand: MICRO-STICK SETMICRO-STICK SET

## (undated) DEVICE — ANGIOGRAPHY DRAPE- RICHMOND: Brand: MEDLINE INDUSTRIES, INC.

## (undated) DEVICE — GDWIRE ANGIO SUP STF 0.035IN --

## (undated) DEVICE — Device: Brand: VISIONS PV .035 DIGITAL IVUS CATHETER